# Patient Record
Sex: MALE | Race: WHITE | NOT HISPANIC OR LATINO | Employment: OTHER | URBAN - METROPOLITAN AREA
[De-identification: names, ages, dates, MRNs, and addresses within clinical notes are randomized per-mention and may not be internally consistent; named-entity substitution may affect disease eponyms.]

---

## 2020-04-01 ENCOUNTER — TELEPHONE (OUTPATIENT)
Dept: FAMILY MEDICINE CLINIC | Facility: CLINIC | Age: 85
End: 2020-04-01

## 2020-04-01 DIAGNOSIS — I10 HYPERTENSION, UNSPECIFIED TYPE: Primary | ICD-10-CM

## 2020-04-01 RX ORDER — TORSEMIDE 20 MG/1
20 TABLET ORAL DAILY
COMMUNITY
Start: 2019-12-28 | End: 2020-04-01 | Stop reason: SDUPTHER

## 2020-04-01 RX ORDER — TORSEMIDE 20 MG/1
20 TABLET ORAL DAILY
Qty: 90 TABLET | Refills: 1 | Status: SHIPPED | OUTPATIENT
Start: 2020-04-01 | End: 2020-04-09 | Stop reason: SDUPTHER

## 2020-04-09 DIAGNOSIS — I48.91 ATRIAL FIBRILLATION, UNSPECIFIED TYPE (HCC): Primary | ICD-10-CM

## 2020-04-09 DIAGNOSIS — I10 HYPERTENSION, UNSPECIFIED TYPE: ICD-10-CM

## 2020-04-09 RX ORDER — APIXABAN 2.5 MG/1
2.5 TABLET, FILM COATED ORAL 2 TIMES DAILY
COMMUNITY
Start: 2020-03-13 | End: 2020-04-09 | Stop reason: SDUPTHER

## 2020-04-09 RX ORDER — TORSEMIDE 20 MG/1
20 TABLET ORAL DAILY
Qty: 90 TABLET | Refills: 1 | Status: SHIPPED | OUTPATIENT
Start: 2020-04-09 | End: 2020-08-10 | Stop reason: SDUPTHER

## 2020-04-09 RX ORDER — APIXABAN 2.5 MG/1
2.5 TABLET, FILM COATED ORAL 2 TIMES DAILY
Qty: 180 TABLET | Refills: 1 | Status: SHIPPED | OUTPATIENT
Start: 2020-04-09 | End: 2020-08-10 | Stop reason: SDUPTHER

## 2020-08-10 DIAGNOSIS — I48.91 ATRIAL FIBRILLATION, UNSPECIFIED TYPE (HCC): ICD-10-CM

## 2020-08-10 DIAGNOSIS — I10 HYPERTENSION, UNSPECIFIED TYPE: ICD-10-CM

## 2020-08-10 RX ORDER — APIXABAN 2.5 MG/1
2.5 TABLET, FILM COATED ORAL 2 TIMES DAILY
Qty: 180 TABLET | Refills: 1 | Status: SHIPPED | OUTPATIENT
Start: 2020-08-10 | End: 2020-08-12 | Stop reason: SDUPTHER

## 2020-08-10 RX ORDER — TORSEMIDE 20 MG/1
20 TABLET ORAL DAILY
Qty: 90 TABLET | Refills: 1 | Status: SHIPPED | OUTPATIENT
Start: 2020-08-10 | End: 2020-08-12 | Stop reason: SDUPTHER

## 2020-08-10 NOTE — TELEPHONE ENCOUNTER
Patient needs refills on Eliquis 2 5 mg one twice daily and Demadex 20 mg one daily to [facility]

## 2020-08-12 DIAGNOSIS — I10 HYPERTENSION, UNSPECIFIED TYPE: ICD-10-CM

## 2020-08-12 DIAGNOSIS — I48.91 ATRIAL FIBRILLATION, UNSPECIFIED TYPE (HCC): ICD-10-CM

## 2020-08-12 RX ORDER — TORSEMIDE 20 MG/1
20 TABLET ORAL DAILY
Qty: 90 TABLET | Refills: 1 | Status: SHIPPED | OUTPATIENT
Start: 2020-08-12 | End: 2021-01-26

## 2020-08-12 RX ORDER — APIXABAN 2.5 MG/1
2.5 TABLET, FILM COATED ORAL 2 TIMES DAILY
Qty: 180 TABLET | Refills: 1 | Status: SHIPPED | OUTPATIENT
Start: 2020-08-12 | End: 2021-01-26

## 2020-08-20 ENCOUNTER — TELEMEDICINE (OUTPATIENT)
Dept: FAMILY MEDICINE CLINIC | Facility: CLINIC | Age: 85
End: 2020-08-20
Payer: MEDICARE

## 2020-08-20 DIAGNOSIS — N18.30 STAGE 3 CHRONIC KIDNEY DISEASE (HCC): ICD-10-CM

## 2020-08-20 DIAGNOSIS — I10 ESSENTIAL HYPERTENSION: Primary | ICD-10-CM

## 2020-08-20 DIAGNOSIS — I48.91 ATRIAL FIBRILLATION, UNSPECIFIED TYPE (HCC): ICD-10-CM

## 2020-08-20 DIAGNOSIS — Z95.0 PACEMAKER: ICD-10-CM

## 2020-08-20 DIAGNOSIS — C61 PROSTATE CANCER (HCC): ICD-10-CM

## 2020-08-20 DIAGNOSIS — K21.9 GASTROESOPHAGEAL REFLUX DISEASE WITHOUT ESOPHAGITIS: ICD-10-CM

## 2020-08-20 PROBLEM — G47.33 OSA (OBSTRUCTIVE SLEEP APNEA): Status: ACTIVE | Noted: 2019-02-11

## 2020-08-20 PROCEDURE — 99442 PR PHYS/QHP TELEPHONE EVALUATION 11-20 MIN: CPT | Performed by: FAMILY MEDICINE

## 2020-08-20 NOTE — PROGRESS NOTES
Virtual Brief Visit    Assessment/Plan:    Problem List Items Addressed This Visit        Digestive    Gastroesophageal reflux disease without esophagitis       Cardiovascular and Mediastinum    Essential hypertension - Primary    Atrial fibrillation ([facility] )       Genitourinary    Stage 3 chronic kidney disease ([facility] [address] )    Prostate cancer ([facility] [address] )       Other    Pacemaker                Reason for visit is No chief complaint on file  Encounter provider Santo Beaver MD    Provider located at [address] [facility] [address] [phone]    Recent Visits  No visits were found meeting these conditions  Showing recent visits within past 7 days and meeting all other requirements     Today's Visits  Date Type Provider Dept   08/20/20 Telemedicine Santo Beaver, [address] today's visits and meeting all other requirements     Future Appointments  No visits were found meeting these conditions  Showing future appointments within next 150 days and meeting all other requirements        After connecting through telephone, the patient was identified by name and date of birth  Chinyere Krishnan was informed that this is a telemedicine visit and that the visit is being conducted through telephone  My office door was closed  No one else was in the room  He acknowledged consent and understanding of privacy and security of the platform  The patient has agreed to participate and understands he can discontinue the visit at any time  Patient is aware this is a billable service  Subjective    Chinyere Krishnan is a 80 y o  male   Pt fro checkup on A fib, HTN, Prostate cancer and  Pacemaker checkup  Pt is doing  Well with his 2 meds and is comp(laining of fatigue  Pt  Is always fatigued but he has  High energy for  A man of his age and does not  Know trhe  Word stop  He is currently working on building another [employer]   This is not a new complaint for him  Pt had seen his cardiologist  Via virtual visit  Pt  Also has not been out of the house since 3/5/20  Past Medical History:   Diagnosis Date    Anemia     Atrial fibrillation (HCC)     GERD (gastroesophageal reflux disease)     Hypertension        Past Surgical History:   Procedure Laterality Date    CARDIAC ELECTROPHYSIOLOGY STUDY AND ABLATION      CARDIAC PACEMAKER PLACEMENT  05/2019    OTHER SURGICAL HISTORY      Cancer Surgery and Brachytherapy radioelements per Rajni    TONSILLECTOMY         Current Outpatient Medications   Medication Sig Dispense Refill    Eliquis 2 5 MG Take 1 tablet (2 5 mg total) by mouth 2 (two) times a day 180 tablet 1    torsemide (DEMADEX) 20 mg tablet Take 1 tablet (20 mg total) by mouth daily 90 tablet 1     No current facility-administered medications for this visit  Allergies not on file    Review of Systems   Constitutional: Positive for fatigue  Negative for activity change, appetite change, chills, fever and unexpected weight change  HENT: Negative for congestion, ear pain, hearing loss, mouth sores, postnasal drip, sinus pressure, sinus pain, sneezing and sore throat  Respiratory: Negative for apnea, cough, shortness of breath and wheezing  Cardiovascular: Negative for chest pain, palpitations and leg swelling  Gastrointestinal: Negative for abdominal pain, constipation, diarrhea, nausea and vomiting  Endocrine: Negative for cold intolerance and heat intolerance  Genitourinary: Negative for dysuria, frequency and hematuria  Musculoskeletal: Negative for arthralgias, back pain, gait problem, joint swelling and neck pain  Skin: Negative for rash  Neurological: Negative for dizziness, weakness and numbness  Hematological: Does not bruise/bleed easily  Psychiatric/Behavioral: Negative for agitation, behavioral problems, confusion, hallucinations and sleep disturbance  The patient is not nervous/anxious          There were no vitals filed for this visit  I spent 15 minutes directly with the patient during this visit    VIRTUAL VISIT DISCLAIMER    Reynold Hernandez acknowledges that he has consented to an online visit or consultation  He understands that the online visit is based solely on information provided by him, and that, in the absence of a face-to-face physical evaluation by the physician, the diagnosis he receives is both limited and provisional in terms of accuracy and completeness  This is not intended to replace a full medical face-to-face evaluation by the physician  Reynold Hernandez understands and accepts these terms

## 2021-01-24 DIAGNOSIS — I48.91 ATRIAL FIBRILLATION, UNSPECIFIED TYPE (HCC): ICD-10-CM

## 2021-01-24 DIAGNOSIS — I10 HYPERTENSION, UNSPECIFIED TYPE: ICD-10-CM

## 2021-01-26 RX ORDER — TORSEMIDE 20 MG/1
TABLET ORAL
Qty: 90 TABLET | Refills: 3 | Status: SHIPPED | OUTPATIENT
Start: 2021-01-26 | End: 2021-01-27

## 2021-01-26 RX ORDER — APIXABAN 2.5 MG/1
TABLET, FILM COATED ORAL
Qty: 180 TABLET | Refills: 3 | Status: SHIPPED | OUTPATIENT
Start: 2021-01-26 | End: 2021-01-27

## 2021-01-27 DIAGNOSIS — I48.91 ATRIAL FIBRILLATION, UNSPECIFIED TYPE (HCC): ICD-10-CM

## 2021-01-27 DIAGNOSIS — I10 HYPERTENSION, UNSPECIFIED TYPE: ICD-10-CM

## 2021-01-27 RX ORDER — APIXABAN 2.5 MG/1
TABLET, FILM COATED ORAL
Qty: 180 TABLET | Refills: 3 | Status: SHIPPED | OUTPATIENT
Start: 2021-01-27 | End: 2021-12-13

## 2021-01-27 RX ORDER — TORSEMIDE 20 MG/1
TABLET ORAL
Qty: 90 TABLET | Refills: 3 | Status: SHIPPED | OUTPATIENT
Start: 2021-01-27 | End: 2021-12-13

## 2021-02-16 ENCOUNTER — TELEPHONE (OUTPATIENT)
Dept: FAMILY MEDICINE CLINIC | Facility: CLINIC | Age: 86
End: 2021-02-16

## 2021-02-16 DIAGNOSIS — G47.33 OSA (OBSTRUCTIVE SLEEP APNEA): Primary | ICD-10-CM

## 2021-02-16 NOTE — TELEPHONE ENCOUNTER
Pt's wife Sadiq Bush) called and stated that patient is having problems with his CPAP and wants to know if you can order a home sleep study

## 2021-02-26 ENCOUNTER — TELEPHONE (OUTPATIENT)
Dept: SLEEP CENTER | Facility: CLINIC | Age: 86
End: 2021-02-26

## 2021-02-26 NOTE — TELEPHONE ENCOUNTER
----- Message from Johanna Sicard, MD sent at 2/25/2021 10:45 AM EST -----  Approved  ----- Message -----  From: Miriam Diaz  Sent: 5/02/0213   8:56 AM EST  To: Sleep Medicine Marcin Departed Provider    This sleep study needs approval      If approved please sign and return to clerical pool  If denied please include reasons why  Also provide alternative testing if warranted  Please sign and return to clerical pool

## 2021-03-03 ENCOUNTER — TELEMEDICINE (OUTPATIENT)
Dept: FAMILY MEDICINE CLINIC | Facility: CLINIC | Age: 86
End: 2021-03-03
Payer: MEDICARE

## 2021-03-03 DIAGNOSIS — J01.00 ACUTE NON-RECURRENT MAXILLARY SINUSITIS: Primary | ICD-10-CM

## 2021-03-03 PROCEDURE — 99442 PR PHYS/QHP TELEPHONE EVALUATION 11-20 MIN: CPT | Performed by: FAMILY MEDICINE

## 2021-03-03 RX ORDER — AMOXICILLIN 500 MG/1
500 TABLET, FILM COATED ORAL
Qty: 30 TABLET | Refills: 0 | Status: SHIPPED | OUTPATIENT
Start: 2021-03-03 | End: 2021-03-13

## 2021-03-03 NOTE — PROGRESS NOTES
Virtual Brief Visit    Assessment/Plan:    Problem List Items Addressed This Visit     None      Visit Diagnoses     Acute non-recurrent maxillary sinusitis    -  Primary    Relevant Medications    amoxicillin (AMOXIL) 500 MG tablet          BMI Counseling: BMI was not able to be calculated due to patient refusing height and/or weight  Reason for visit is No chief complaint on file  Encounter provider Gokul Cabral MD    Provider located at [address] [facility] [address] [phone]    Recent Visits  No visits were found meeting these conditions  Showing recent visits within past 7 days and meeting all other requirements     Today's Visits  Date Type Provider Dept   03/03/21 Telemedicine Gokul Cabral, [address] today's visits and meeting all other requirements     Future Appointments  No visits were found meeting these conditions  Showing future appointments within next 150 days and meeting all other requirements        After connecting through telephone, the patient was identified by name and date of birth  [employer] was informed that this is a telemedicine visit and that the visit is being conducted through telephone  My office door was closed  No one else was in the room  He acknowledged consent and understanding of privacy and security of the platform  The patient has agreed to participate and understands he can discontinue the visit at any time  Patient is aware this is a billable service  Subjective    Nicky Woods is a 80 y o  male   Pt for virtual visit  For  c/o sinus pain and pressure  And has post nasal drip  Pt  With no fevers nor ear pain  Pt  With not much cough but has some  Pt  Is asking to get a  Culture of his noise and his daughter will  for him         Past Medical History:   Diagnosis Date    Anemia     Atrial fibrillation (HCC)     GERD (gastroesophageal reflux disease)     Hypertension        Past Surgical History:   Procedure Laterality Date    CARDIAC ELECTROPHYSIOLOGY STUDY AND ABLATION      CARDIAC PACEMAKER PLACEMENT  05/2019    OTHER SURGICAL HISTORY      Cancer Surgery and Brachytherapy radioelements per Rajni    TONSILLECTOMY         Current Outpatient Medications   Medication Sig Dispense Refill    amoxicillin (AMOXIL) 500 MG tablet Take 1 tablet (500 mg total) by mouth 3 (three) times a day for 10 days 30 tablet 0    Eliquis 2 5 MG TAKE 1 TABLET BY MOUTH  TWICE DAILY 180 tablet 3    torsemide (DEMADEX) 20 mg tablet TAKE 1 TABLET BY MOUTH  DAILY 90 tablet 3     No current facility-administered medications for this visit  Not on File    Review of Systems   Constitutional: Positive for fatigue  HENT: Positive for postnasal drip, rhinorrhea, sinus pressure, sinus pain and sore throat  Respiratory: Positive for cough  There were no vitals filed for this visit  I spent 15 minutes directly with the patient during this visit    VIRTUAL VISIT DISCLAIMER    Severo Calk acknowledges that he has consented to an online visit or consultation  He understands that the online visit is based solely on information provided by him, and that, in the absence of a face-to-face physical evaluation by the physician, the diagnosis he receives is both limited and provisional in terms of accuracy and completeness  This is not intended to replace a full medical face-to-face evaluation by the physician  Severo Calk understands and accepts these terms

## 2021-05-12 ENCOUNTER — IMMUNIZATIONS (OUTPATIENT)
Dept: FAMILY MEDICINE CLINIC | Facility: HOSPITAL | Age: 86
End: 2021-05-12

## 2021-05-12 DIAGNOSIS — Z23 ENCOUNTER FOR IMMUNIZATION: Primary | ICD-10-CM

## 2021-05-12 PROCEDURE — 91300 SARS-COV-2 / COVID-19 MRNA VACCINE (PFIZER-BIONTECH) 30 MCG: CPT

## 2021-05-12 PROCEDURE — 0001A SARS-COV-2 / COVID-19 MRNA VACCINE (PFIZER-BIONTECH) 30 MCG: CPT

## 2021-06-02 ENCOUNTER — IMMUNIZATIONS (OUTPATIENT)
Dept: FAMILY MEDICINE CLINIC | Facility: HOSPITAL | Age: 86
End: 2021-06-02

## 2021-06-02 DIAGNOSIS — Z23 ENCOUNTER FOR IMMUNIZATION: Primary | ICD-10-CM

## 2021-06-02 PROCEDURE — 91300 SARS-COV-2 / COVID-19 MRNA VACCINE (PFIZER-BIONTECH) 30 MCG: CPT

## 2021-06-02 PROCEDURE — 0002A SARS-COV-2 / COVID-19 MRNA VACCINE (PFIZER-BIONTECH) 30 MCG: CPT

## 2021-06-16 ENCOUNTER — TELEPHONE (OUTPATIENT)
Dept: FAMILY MEDICINE CLINIC | Facility: CLINIC | Age: 86
End: 2021-06-16

## 2021-06-16 DIAGNOSIS — Z13.220 SCREENING CHOLESTEROL LEVEL: ICD-10-CM

## 2021-06-16 DIAGNOSIS — I10 HYPERTENSION, UNSPECIFIED TYPE: Primary | ICD-10-CM

## 2021-06-16 DIAGNOSIS — I48.91 ATRIAL FIBRILLATION, UNSPECIFIED TYPE (HCC): ICD-10-CM

## 2021-06-16 DIAGNOSIS — N18.30 STAGE 3 CHRONIC KIDNEY DISEASE, UNSPECIFIED WHETHER STAGE 3A OR 3B CKD (HCC): ICD-10-CM

## 2021-06-16 DIAGNOSIS — Z95.0 PACEMAKER: ICD-10-CM

## 2021-06-16 NOTE — TELEPHONE ENCOUNTER
Received a call stating that he saw the cardiologist yesterday and they are recommending for him to have bloodwork done  They are suggesting Thyroid, glucose, vit d, electrolights and UTI  She is requesting for you to order the bloodwork and anything else you think is necessary  And will come in for visit with done       Call when ready and they will go to a [facility]

## 2021-06-21 ENCOUNTER — APPOINTMENT (OUTPATIENT)
Dept: LAB | Facility: MEDICAL CENTER | Age: 86
End: 2021-06-21
Payer: MEDICARE

## 2021-06-21 DIAGNOSIS — I10 HYPERTENSION, UNSPECIFIED TYPE: ICD-10-CM

## 2021-06-21 DIAGNOSIS — Z13.220 SCREENING CHOLESTEROL LEVEL: ICD-10-CM

## 2021-06-21 LAB
ALBUMIN SERPL BCP-MCNC: 3.7 G/DL (ref 3.5–5)
ALP SERPL-CCNC: 76 U/L (ref 46–116)
ALT SERPL W P-5'-P-CCNC: 33 U/L (ref 12–78)
ANION GAP SERPL CALCULATED.3IONS-SCNC: 6 MMOL/L (ref 4–13)
AST SERPL W P-5'-P-CCNC: 20 U/L (ref 5–45)
BACTERIA UR QL AUTO: ABNORMAL /HPF
BASOPHILS # BLD AUTO: 0.05 THOUSANDS/ΜL (ref 0–0.1)
BASOPHILS NFR BLD AUTO: 1 % (ref 0–1)
BILIRUB SERPL-MCNC: 1.13 MG/DL (ref 0.2–1)
BILIRUB UR QL STRIP: NEGATIVE
BUN SERPL-MCNC: 44 MG/DL (ref 5–25)
CALCIUM SERPL-MCNC: 9.5 MG/DL (ref 8.3–10.1)
CHLORIDE SERPL-SCNC: 105 MMOL/L (ref 100–108)
CHOLEST SERPL-MCNC: 143 MG/DL (ref 50–200)
CLARITY UR: CLEAR
CO2 SERPL-SCNC: 28 MMOL/L (ref 21–32)
COLOR UR: ABNORMAL
CREAT SERPL-MCNC: 1.84 MG/DL (ref 0.6–1.3)
EOSINOPHIL # BLD AUTO: 0.17 THOUSAND/ΜL (ref 0–0.61)
EOSINOPHIL NFR BLD AUTO: 4 % (ref 0–6)
ERYTHROCYTE [DISTWIDTH] IN BLOOD BY AUTOMATED COUNT: 15.8 % (ref 11.6–15.1)
GFR SERPL CREATININE-BSD FRML MDRD: 32 ML/MIN/1.73SQ M
GLUCOSE P FAST SERPL-MCNC: 91 MG/DL (ref 65–99)
GLUCOSE UR STRIP-MCNC: NEGATIVE MG/DL
HCT VFR BLD AUTO: 46.2 % (ref 36.5–49.3)
HDLC SERPL-MCNC: 40 MG/DL
HGB BLD-MCNC: 14.8 G/DL (ref 12–17)
HGB UR QL STRIP.AUTO: NEGATIVE
HYALINE CASTS #/AREA URNS LPF: ABNORMAL /LPF
IMM GRANULOCYTES # BLD AUTO: 0.01 THOUSAND/UL (ref 0–0.2)
IMM GRANULOCYTES NFR BLD AUTO: 0 % (ref 0–2)
KETONES UR STRIP-MCNC: NEGATIVE MG/DL
LDLC SERPL CALC-MCNC: 89 MG/DL (ref 0–100)
LEUKOCYTE ESTERASE UR QL STRIP: NEGATIVE
LYMPHOCYTES # BLD AUTO: 0.63 THOUSANDS/ΜL (ref 0.6–4.47)
LYMPHOCYTES NFR BLD AUTO: 15 % (ref 14–44)
MAGNESIUM SERPL-MCNC: 2.8 MG/DL (ref 1.6–2.6)
MCH RBC QN AUTO: 32 PG (ref 26.8–34.3)
MCHC RBC AUTO-ENTMCNC: 32 G/DL (ref 31.4–37.4)
MCV RBC AUTO: 100 FL (ref 82–98)
MONOCYTES # BLD AUTO: 0.47 THOUSAND/ΜL (ref 0.17–1.22)
MONOCYTES NFR BLD AUTO: 11 % (ref 4–12)
NEUTROPHILS # BLD AUTO: 2.85 THOUSANDS/ΜL (ref 1.85–7.62)
NEUTS SEG NFR BLD AUTO: 69 % (ref 43–75)
NITRITE UR QL STRIP: NEGATIVE
NON-SQ EPI CELLS URNS QL MICRO: ABNORMAL /HPF
NRBC BLD AUTO-RTO: 0 /100 WBCS
PH UR STRIP.AUTO: 6.5 [PH]
PLATELET # BLD AUTO: 152 THOUSANDS/UL (ref 149–390)
PMV BLD AUTO: 10.7 FL (ref 8.9–12.7)
POTASSIUM SERPL-SCNC: 4.5 MMOL/L (ref 3.5–5.3)
PROT SERPL-MCNC: 7 G/DL (ref 6.4–8.2)
PROT UR STRIP-MCNC: ABNORMAL MG/DL
RBC # BLD AUTO: 4.62 MILLION/UL (ref 3.88–5.62)
RBC #/AREA URNS AUTO: ABNORMAL /HPF
SODIUM SERPL-SCNC: 139 MMOL/L (ref 136–145)
SP GR UR STRIP.AUTO: 1.02 (ref 1–1.03)
T4 FREE SERPL-MCNC: 0.97 NG/DL (ref 0.76–1.46)
TRIGL SERPL-MCNC: 70 MG/DL
TSH SERPL DL<=0.05 MIU/L-ACNC: 5.25 UIU/ML (ref 0.36–3.74)
URATE SERPL-MCNC: 6.7 MG/DL (ref 4.2–8)
UROBILINOGEN UR QL STRIP.AUTO: 0.2 E.U./DL
WBC # BLD AUTO: 4.18 THOUSAND/UL (ref 4.31–10.16)
WBC #/AREA URNS AUTO: ABNORMAL /HPF

## 2021-06-21 PROCEDURE — 84550 ASSAY OF BLOOD/URIC ACID: CPT

## 2021-06-21 PROCEDURE — 81001 URINALYSIS AUTO W/SCOPE: CPT | Performed by: FAMILY MEDICINE

## 2021-06-21 PROCEDURE — 83735 ASSAY OF MAGNESIUM: CPT

## 2021-06-21 PROCEDURE — 80053 COMPREHEN METABOLIC PANEL: CPT

## 2021-06-21 PROCEDURE — 84439 ASSAY OF FREE THYROXINE: CPT

## 2021-06-21 PROCEDURE — 80061 LIPID PANEL: CPT

## 2021-06-21 PROCEDURE — 36415 COLL VENOUS BLD VENIPUNCTURE: CPT

## 2021-06-21 PROCEDURE — 84443 ASSAY THYROID STIM HORMONE: CPT

## 2021-06-21 PROCEDURE — 85025 COMPLETE CBC W/AUTO DIFF WBC: CPT

## 2021-07-06 PROBLEM — I50.32 CHRONIC DIASTOLIC CONGESTIVE HEART FAILURE (HCC): Status: ACTIVE | Noted: 2021-07-06

## 2021-07-08 ENCOUNTER — OFFICE VISIT (OUTPATIENT)
Dept: FAMILY MEDICINE CLINIC | Facility: CLINIC | Age: 86
End: 2021-07-08
Payer: MEDICARE

## 2021-07-08 VITALS
HEART RATE: 83 BPM | TEMPERATURE: 97.4 F | SYSTOLIC BLOOD PRESSURE: 118 MMHG | DIASTOLIC BLOOD PRESSURE: 64 MMHG | WEIGHT: 155 LBS | HEIGHT: 70 IN | BODY MASS INDEX: 22.19 KG/M2 | OXYGEN SATURATION: 97 % | RESPIRATION RATE: 16 BRPM

## 2021-07-08 DIAGNOSIS — N18.30 STAGE 3 CHRONIC KIDNEY DISEASE, UNSPECIFIED WHETHER STAGE 3A OR 3B CKD (HCC): ICD-10-CM

## 2021-07-08 DIAGNOSIS — Z23 ENCOUNTER FOR IMMUNIZATION: ICD-10-CM

## 2021-07-08 DIAGNOSIS — K21.9 GASTROESOPHAGEAL REFLUX DISEASE WITHOUT ESOPHAGITIS: Primary | ICD-10-CM

## 2021-07-08 DIAGNOSIS — C61 PROSTATE CANCER (HCC): ICD-10-CM

## 2021-07-08 DIAGNOSIS — F02.80 EARLY ONSET ALZHEIMER'S DEMENTIA WITHOUT BEHAVIORAL DISTURBANCE (HCC): ICD-10-CM

## 2021-07-08 DIAGNOSIS — I48.91 ATRIAL FIBRILLATION, UNSPECIFIED TYPE (HCC): ICD-10-CM

## 2021-07-08 DIAGNOSIS — I10 ESSENTIAL HYPERTENSION: ICD-10-CM

## 2021-07-08 DIAGNOSIS — Z00.00 WELL ADULT EXAM: ICD-10-CM

## 2021-07-08 DIAGNOSIS — G30.0 EARLY ONSET ALZHEIMER'S DEMENTIA WITHOUT BEHAVIORAL DISTURBANCE (HCC): ICD-10-CM

## 2021-07-08 DIAGNOSIS — I50.32 CHRONIC DIASTOLIC CONGESTIVE HEART FAILURE (HCC): ICD-10-CM

## 2021-07-08 DIAGNOSIS — J41.0 SIMPLE CHRONIC BRONCHITIS (HCC): ICD-10-CM

## 2021-07-08 DIAGNOSIS — Z95.0 PACEMAKER: ICD-10-CM

## 2021-07-08 PROCEDURE — 99214 OFFICE O/P EST MOD 30 MIN: CPT | Performed by: FAMILY MEDICINE

## 2021-07-08 PROCEDURE — 1123F ACP DISCUSS/DSCN MKR DOCD: CPT | Performed by: FAMILY MEDICINE

## 2021-07-08 PROCEDURE — G0439 PPPS, SUBSEQ VISIT: HCPCS | Performed by: FAMILY MEDICINE

## 2021-07-08 RX ORDER — ACETAMINOPHEN 500 MG
500 TABLET ORAL 2 TIMES DAILY
COMMUNITY

## 2021-07-08 NOTE — PATIENT INSTRUCTIONS
Medicare Preventive Visit Patient Instructions  Thank you for completing your Welcome to Medicare Visit or Medicare Annual Wellness Visit today  Your next wellness visit will be due in one year (7/9/2022)  The screening/preventive services that you may require over the next 5-10 years are detailed below  Some tests may not apply to you based off risk factors and/or age  Screening tests ordered at today's visit but not completed yet may show as past due  Also, please note that scanned in results may not display below  Preventive Screenings:  Service Recommendations Previous Testing/Comments   Colorectal Cancer Screening  · Colonoscopy    · Fecal Occult Blood Test (FOBT)/Fecal Immunochemical Test (FIT)  · Fecal DNA/Cologuard Test  · Flexible Sigmoidoscopy Age: 54-65 years old   Colonoscopy: every 10 years (May be performed more frequently if at higher risk)  OR  FOBT/FIT: every 1 year  OR  Cologuard: every 3 years  OR  Sigmoidoscopy: every 5 years  Screening may be recommended earlier than age 48 if at higher risk for colorectal cancer  Also, an individualized decision between you and your healthcare provider will decide whether screening between the ages of 74-80 would be appropriate   Colonoscopy: Not on file  FOBT/FIT: Not on file  Cologuard: Not on file  Sigmoidoscopy: Not on file    Screening Not Indicated     Prostate Cancer Screening Individualized decision between patient and health care provider in men between ages of 53-78   Medicare will cover every 12 months beginning on the day after your 50th birthday PSA: No results in last 5 years     History Prostate Cancer  Screening Not Indicated     Hepatitis C Screening Once for adults born between [birth year] and 1965  More frequently in patients at high risk for Hepatitis C Hep C Antibody: Not on file        Diabetes Screening 1-2 times per year if you're at risk for diabetes or have pre-diabetes Fasting glucose: 91 mg/dL   A1C: No results in last 5 years    Screening Current   Cholesterol Screening Once every 5 years if you don't have a lipid disorder  May order more often based on risk factors  Lipid panel: 06/21/2021    Screening Current      Other Preventive Screenings Covered by Medicare:  1  Abdominal Aortic Aneurysm (AAA) Screening: covered once if your at risk  You're considered to be at risk if you have a family history of AAA or a male between the age of 73-68 who smoking at least 100 cigarettes in your lifetime  2  Lung Cancer Screening: covers low dose CT scan once per year if you meet all of the following conditions: (1) Age 50-69; (2) No signs or symptoms of lung cancer; (3) Current smoker or have quit smoking within the last 15 years; (4) You have a tobacco smoking history of at least 30 pack years (packs per day x number of years you smoked); (5) You get a written order from a healthcare provider  3  Glaucoma Screening: covered annually if you're considered high risk: (1) You have diabetes OR (2) Family history of glaucoma OR (3)  aged 48 and older OR (3)  American aged 72 and older  3  Osteoporosis Screening: covered every 2 years if you meet one of the following conditions: (1) Have a vertebral abnormality; (2) On glucocorticoid therapy for more than 3 months; (3) Have primary hyperparathyroidism; (4) On osteoporosis medications and need to assess response to drug therapy  5  HIV Screening: covered annually if you're between the age of 12-76  Also covered annually if you are younger than 13 and older than 72 with risk factors for HIV infection  For pregnant patients, it is covered up to 3 times per pregnancy      Immunizations:  Immunization Recommendations   Influenza Vaccine Annual influenza vaccination during flu season is recommended for all persons aged >= 6 months who do not have contraindications   Pneumococcal Vaccine (Prevnar and Pneumovax)  * Prevnar = PCV13  * Pneumovax = PPSV23 Adults 25-60 years old: 1-3 doses may be recommended based on certain risk factors  Adults 72 years old: Prevnar (PCV13) vaccine recommended followed by Pneumovax (PPSV23) vaccine  If already received PPSV23 since turning 65, then PCV13 recommended at least one year after PPSV23 dose  Hepatitis B Vaccine 3 dose series if at intermediate or high risk (ex: diabetes, end stage renal disease, liver disease)   Tetanus (Td) Vaccine - COST NOT COVERED BY MEDICARE PART B Following completion of primary series, a booster dose should be given every 10 years to maintain immunity against tetanus  Td may also be given as tetanus wound prophylaxis  Tdap Vaccine - COST NOT COVERED BY MEDICARE PART B Recommended at least once for all adults  For pregnant patients, recommended with each pregnancy  Shingles Vaccine (Shingrix) - COST NOT COVERED BY MEDICARE PART B  2 shot series recommended in those aged 48 and above     Health Maintenance Due:  There are no preventive care reminders to display for this patient  Immunizations Due:      Topic Date Due    Pneumococcal Vaccine: 65+ Years (1 of 2 - PPSV23) Never done    DTaP,Tdap,and Td Vaccines (1 - Tdap) Never done    Influenza Vaccine (1) 09/01/2021     Advance Directives   What are advance directives? Advance directives are legal documents that state your wishes and plans for medical care  These plans are made ahead of time in case you lose your ability to make decisions for yourself  Advance directives can apply to any medical decision, such as the treatments you want, and if you want to donate organs  What are the types of advance directives? There are many types of advance directives, and each state has rules about how to use them  You may choose a combination of any of the following:  · Living will: This is a written record of the treatment you want  You can also choose which treatments you do not want, which to limit, and which to stop at a certain time   This includes surgery, medicine, IV fluid, and tube feedings  · Durable power of  for healthcare [facility]): This is a written record that states who you want to make healthcare choices for you when you are unable to make them for yourself  This person, called a proxy, is usually a family member or a friend  You may choose more than 1 proxy  · Do not resuscitate (DNR) order:  A DNR order is used in case your heart stops beating or you stop breathing  It is a request not to have certain forms of treatment, such as CPR  A DNR order may be included in other types of advance directives  · Medical directive: This covers the care that you want if you are in a coma, near death, or unable to make decisions for yourself  You can list the treatments you want for each condition  Treatment may include pain medicine, surgery, blood transfusions, dialysis, IV or tube feedings, and a ventilator (breathing machine)  · Values history: This document has questions about your views, beliefs, and how you feel and think about life  This information can help others choose the care that you would choose  Why are advance directives important? An advance directive helps you control your care  Although spoken wishes may be used, it is better to have your wishes written down  Spoken wishes can be misunderstood, or not followed  Treatments may be given even if you do not want them  An advance directive may make it easier for your family to make difficult choices about your care  © Copyright [employer] 2018 Information is for End User's use only and may not be sold, redistributed or otherwise used for commercial purposes   All illustrations and images included in CareNotes® are the copyrighted property of A D A M , Inc  or [address] [place], [place] [address]

## 2021-07-08 NOTE — PROGRESS NOTES
Assessment and Plan:     Problem List Items Addressed This Visit        Digestive    Gastroesophageal reflux disease without esophagitis - Primary     Patient to continue with present therapy and decrease caffeine, avoid ETOH and smoking to decrease acid production  Pt should also cease eating prior to bedtime and avoid excessive fluid intake prior to sleep  May use antacids as needed for breakthrough GERD  All pateint questions answered today about this condition  Cardiovascular and Mediastinum    Essential hypertension     Patient is stable with current anti-hypertensive medicine and continue to follow a low sodium diet and take current medication  All questions about this condition were answered today  Atrial fibrillation [facility])     Patient is stable  and will continue present plan of care and reassess at next routine visit  All questions about this problem from patient were answered today  Genitourinary    Stage 3 chronic kidney disease ([facility] [address] )     Lab Results   Component Value Date    EGFR 32 06/21/2021    CREATININE 1 84 (H) 06/21/2021    CREATININE 1 48 (H) 10/20/2014   Patient is stable  and will continue present plan of care and reassess at next routine visit  All questions about this problem from patient were answered today  Other    Pacemaker     Patient is stable  and will continue present plan of care and reassess at next routine visit  All questions about this problem from patient were answered today  Other Visit Diagnoses     Encounter for immunization        Well adult exam               Preventive health issues were discussed with patient, and age appropriate screening tests were ordered as noted in patient's After Visit Summary  Personalized health advice and appropriate referrals for health education or preventive services given if needed, as noted in patient's After Visit Summary       History of Present Illness:     Patient presents for Medicare Annual Wellness visit    Patient Care Team:  Yoselin Henderson MD as PCP - General (Family Medicine)     Problem List:     Patient Active Problem List   Diagnosis    Stage 3 chronic kidney disease ([facility] )    CARROLL (obstructive sleep apnea)    Pacemaker    Prostate cancer ([facility] )    Essential hypertension    Gastroesophageal reflux disease without esophagitis    Atrial fibrillation (HCC)    Chronic diastolic congestive heart failure ([facility] )      Past Medical and Surgical History:     Past Medical History:   Diagnosis Date    Anemia     Atrial fibrillation ([facility] )     Hypertension      Past Surgical History:   Procedure Laterality Date    CARDIAC ELECTROPHYSIOLOGY STUDY AND ABLATION      CARDIAC PACEMAKER PLACEMENT  2019    OTHER SURGICAL HISTORY      Cancer Surgery and Brachytherapy radioelements per [facility]    TONSILLECTOMY        Family History:     Family History   Family history unknown: Yes      Social History:     Social History     Socioeconomic History    Marital status: /Civil Union     Spouse name: None    Number of children: None    Years of education: None    Highest education level: None   Occupational History    Occupation: Retired   Tobacco Use    Smoking status: Never Smoker    Smokeless tobacco: Never Used   Vaping Use    Vaping Use: Never used   Substance and Sexual Activity    Alcohol use: Yes     Comment: occasional    Drug use: None    Sexual activity: None   Other Topics Concern    None   Social History Narrative    · Most recent tobacco use screenin2020      · Do you currently or have you served in the [employer]:   No      · Were you activated, into active duty, as a member of the [employer] or as a Reservist:   No      · Exercise level: Moderate      · Diet:   Regular      · General stress level:   Medium      · Alcohol intake:   Occasional      · Caffeine intake:   Heavy      · Seat belts used routinely:   Yes      · Smoke alarm in home:    Yes · Advance directive:   Yes      Social Determinants of Health     Financial Resource Strain:     Difficulty of Paying Living Expenses:    Food Insecurity:     Worried About Running Out of Food in the Last Year:     [address] in the Last Year:    Transportation Needs:     Lack of Transportation (Medical):  Lack of Transportation (Non-Medical):    Physical Activity:     Days of Exercise per Week:     Minutes of Exercise per Session:    Stress:     Feeling of Stress :    Social Connections:     Frequency of Communication with Friends and Family:     Frequency of Social Gatherings with Friends and Family:     Attends Spiritism Services:     Active Member of Clubs or Organizations:     Attends Club or Organization Meetings:     Marital Status:    Intimate Partner Violence:     Fear of Current or Ex-Partner:     Emotionally Abused:     Physically Abused:     Sexually Abused:       Medications and Allergies:     Current Outpatient Medications   Medication Sig Dispense Refill    acetaminophen (TYLENOL) 500 mg tablet Take 500 mg by mouth 2 (two) times a day      bimatoprost (LUMIGAN) 0 01 % ophthalmic drops 1 drop daily at bedtime      Eliquis 2 5 MG TAKE 1 TABLET BY MOUTH  TWICE DAILY 180 tablet 3    torsemide (DEMADEX) 20 mg tablet TAKE 1 TABLET BY MOUTH  DAILY 90 tablet 3    ascorbic acid (VITAMIN C) 1000 MG tablet Take 1,000 mg by mouth daily       No current facility-administered medications for this visit  No Known Allergies   Immunizations:     Immunization History   Administered Date(s) Administered    SARS-CoV-2 / COVID-19 mRNA IM (Pfizer-BioNTech) 05/12/2021, 06/02/2021      Health Maintenance: There are no preventive care reminders to display for this patient        Topic Date Due    Pneumococcal Vaccine: 65+ Years (1 of 2 - PPSV23) Never done    DTaP,Tdap,and Td Vaccines (1 - Tdap) Never done    Influenza Vaccine (1) 09/01/2021      Medicare Health Risk Assessment: /64 (BP Location: Left arm, Patient Position: Sitting, Cuff Size: Standard)   Pulse 83   Temp (!) 97 4 °F (36 3 °C)   Resp 16   Ht 5' 10" (1 778 m)   Wt 70 3 kg (155 lb)   SpO2 97%   BMI 22 24 kg/m²      Brenda Gray is here for his Initial Wellness visit  Health Risk Assessment:   Patient rates overall health as good  Patient feels that their physical health rating is same  Patient is satisfied with their life  Eyesight was rated as slightly worse  Hearing was rated as slightly worse  Patient feels that their emotional and mental health rating is same  Patients states they are never, rarely angry  Patient states they are often unusually tired/fatigued  Pain experienced in the last 7 days has been none  Patient states that he has experienced no weight loss or gain in last 6 months  Depression Screening:   PHQ-2 Score: 0      Fall Risk Screening: In the past year, patient has experienced: no history of falling in past year      Home Safety:  Patient has trouble with stairs inside or outside of their home  Patient has working smoke alarms and has working carbon monoxide detector  Home safety hazards include: none  Nutrition:   Current diet is Regular  Medications:   Patient is not currently taking any over-the-counter supplements  Patient is able to manage medications  Activities of Daily Living (ADLs)/Instrumental Activities of Daily Living (IADLs):   Walk and transfer into and out of bed and chair?: Yes  Dress and groom yourself?: Yes    Bathe or shower yourself?: Yes    Feed yourself?  Yes  Do your laundry/housekeeping?: No  Manage your money, pay your bills and track your expenses?: No  Make your own meals?: Yes    Do your own shopping?: No    Previous Hospitalizations:   Any hospitalizations or ED visits within the last 12 months?: No      Advance Care Planning:   Living will: Yes    Advanced directive: Yes      Cognitive Screening:   Provider or family/friend/caregiver concerned regarding cognition?: Yes    PREVENTIVE SCREENINGS      Cardiovascular Screening:    General: Screening Current      Diabetes Screening:     General: Screening Current      Colorectal Cancer Screening:     General: Screening Not Indicated      Prostate Cancer Screening:    General: History Prostate Cancer and Screening Not Indicated      Lung Cancer Screening:     General: Screening Not Indicated    Screening, Brief Intervention, and Referral to Treatment (SBIRT)    Screening  Typical number of drinks in a day: 0  Typical number of drinks in a week: 0  Interpretation: Low risk drinking behavior      Single Item Drug Screening:  How often have you used an illegal drug (including marijuana) or a prescription medication for non-medical reasons in the past year? never    Single Item Drug Screen Score: 0  Interpretation: Negative screen for possible drug use disorder      Jaylan Guerrier MD

## 2021-07-08 NOTE — PROGRESS NOTES
Falls Plan of Care: balance, strength, and gait training instructions were provided  Home safety education provided  Assessment/Plan:         Problem List Items Addressed This Visit        Digestive    Gastroesophageal reflux disease without esophagitis - Primary     Patient to continue with present therapy and decrease caffeine, avoid ETOH and smoking to decrease acid production  Pt should also cease eating prior to bedtime and avoid excessive fluid intake prior to sleep  May use antacids as needed for breakthrough GERD  All pateint questions answered today about this condition  Cardiovascular and Mediastinum    Essential hypertension     Patient is stable with current anti-hypertensive medicine and continue to follow a low sodium diet and take current medication  All questions about this condition were answered today  Atrial fibrillation [facility])     Patient is stable  and will continue present plan of care and reassess at next routine visit  All questions about this problem from patient were answered today  Genitourinary    Stage 3 chronic kidney disease ([facility] [address] )     Lab Results   Component Value Date    EGFR 32 06/21/2021    CREATININE 1 84 (H) 06/21/2021    CREATININE 1 48 (H) 10/20/2014   Patient is stable  and will continue present plan of care and reassess at next routine visit  All questions about this problem from patient were answered today  Other    Pacemaker     Patient is stable  and will continue present plan of care and reassess at next routine visit  All questions about this problem from patient were answered today  Other Visit Diagnoses     Encounter for immunization                Subjective:      Patient ID: Boo Tejeda is a 80 y o  male  THIS IS AN 80YEAR-OLD WHITE MALE HERE TODAY FOR CHECKUP ON GERD HYPERTENSION HISTORY OF ATRIAL FIBRILLATION AND ALSO HISTORY OF ANEMIA    PATIENT SEEN BACK IN THE OFFICE AFTER YEAR AND HALF WITH THE COVID SHOT DOWN HAS SOME DECLINE IN HIS MENTAL STATUS DOES HAVE SOME EARLY SIGNS OF DECLINE IN MEMORY AND JUDGMENT PERSONALITY AVILA IS GOOD GETTING SOME DAYS AND NIGHTS MIXED UP  PATIENT WILL NEED TO GET EVALUATED FOR SOME ALZHEIMER'S SEE ABOUT DOING A FOLSTEIN A B12 AND FOLATE TO SEE IF AND WHERE THIS GENTLEMAN IS AT ON THE SCALE AT IF AT ALL  The following portions of the patient's history were reviewed and updated as appropriate:   Past Medical History:  He has a past medical history of Anemia, Atrial fibrillation ([address] ), and Hypertension  ,  _______________________________________________________________________  Medical Problems:  does not have any pertinent problems on file ,  _______________________________________________________________________  Past Surgical History:   has a past surgical history that includes Cardiac electrophysiology study and ablation; Tonsillectomy; Cardiac pacemaker placement (05/2019); and Other surgical history  ,  _______________________________________________________________________  Family History:  Family history is unknown by patient  ,  _______________________________________________________________________  Social History:   reports that he has never smoked  He has never used smokeless tobacco  He reports current alcohol use  No history on file for drug use ,  _______________________________________________________________________  Allergies:  has No Known Allergies     _______________________________________________________________________  Current Outpatient Medications   Medication Sig Dispense Refill    Eliquis 2 5 MG TAKE 1 TABLET BY MOUTH  TWICE DAILY 180 tablet 3    torsemide (DEMADEX) 20 mg tablet TAKE 1 TABLET BY MOUTH  DAILY 90 tablet 3     No current facility-administered medications for this visit      _______________________________________________________________________  Review of Systems   Constitutional: Negative for activity change, appetite change, chills, fatigue, fever and unexpected weight change  HENT: Negative for congestion, ear pain, hearing loss, mouth sores, postnasal drip, sinus pressure, sinus pain, sneezing and sore throat  Respiratory: Negative for apnea, cough, shortness of breath and wheezing  Cardiovascular: Negative for chest pain, palpitations and leg swelling  Gastrointestinal: Negative for abdominal pain, constipation, diarrhea, nausea and vomiting  Endocrine: Negative for cold intolerance and heat intolerance  Genitourinary: Negative for dysuria, frequency and hematuria  Musculoskeletal: Negative for arthralgias, back pain, gait problem, joint swelling and neck pain  Skin: Negative for rash  Neurological: Negative for dizziness, weakness and numbness  Hematological: Does not bruise/bleed easily  Psychiatric/Behavioral: Negative for agitation, behavioral problems, confusion, hallucinations and sleep disturbance  The patient is not nervous/anxious  Decreased mental capacity needs Folstein  Objective: There were no vitals filed for this visit  There is no height or weight on file to calculate BMI  Physical Exam  Vitals and nursing note reviewed  Constitutional:       Appearance: He is well-developed  HENT:      Head: Normocephalic and atraumatic  Nose: Nose normal    Eyes:      General: No scleral icterus  Conjunctiva/sclera: Conjunctivae normal       Pupils: Pupils are equal, round, and reactive to light  Neck:      Thyroid: No thyromegaly  Cardiovascular:      Rate and Rhythm: Normal rate and regular rhythm  Heart sounds: Normal heart sounds  Pulmonary:      Effort: Pulmonary effort is normal  No respiratory distress  Breath sounds: Normal breath sounds  No wheezing  Abdominal:      General: Bowel sounds are normal       Palpations: Abdomen is soft  Tenderness: There is no abdominal tenderness  There is no guarding or rebound     Musculoskeletal: General: Normal range of motion  Cervical back: Normal range of motion and neck supple  Skin:     General: Skin is warm and dry  Findings: No rash  Neurological:      Mental Status: He is alert     Psychiatric:         Behavior: Behavior normal

## 2021-07-08 NOTE — ASSESSMENT & PLAN NOTE
Lab Results   Component Value Date    EGFR 32 06/21/2021    CREATININE 1 84 (H) 06/21/2021    CREATININE 1 48 (H) 10/20/2014   Patient is stable  and will continue present plan of care and reassess at next routine visit  All questions about this problem from patient were answered today

## 2021-07-20 ENCOUNTER — APPOINTMENT (OUTPATIENT)
Dept: LAB | Facility: MEDICAL CENTER | Age: 86
End: 2021-07-20
Payer: MEDICARE

## 2021-07-20 DIAGNOSIS — F02.80 EARLY ONSET ALZHEIMER'S DEMENTIA WITHOUT BEHAVIORAL DISTURBANCE (HCC): ICD-10-CM

## 2021-07-20 DIAGNOSIS — G30.0 EARLY ONSET ALZHEIMER'S DEMENTIA WITHOUT BEHAVIORAL DISTURBANCE (HCC): ICD-10-CM

## 2021-07-20 LAB
FOLATE SERPL-MCNC: >20 NG/ML (ref 3.1–17.5)
VIT B12 SERPL-MCNC: 4315 PG/ML (ref 100–900)

## 2021-07-20 PROCEDURE — 36415 COLL VENOUS BLD VENIPUNCTURE: CPT

## 2021-07-20 PROCEDURE — 82746 ASSAY OF FOLIC ACID SERUM: CPT

## 2021-07-20 PROCEDURE — 82607 VITAMIN B-12: CPT

## 2021-07-26 ENCOUNTER — OFFICE VISIT (OUTPATIENT)
Dept: FAMILY MEDICINE CLINIC | Facility: CLINIC | Age: 86
End: 2021-07-26
Payer: MEDICARE

## 2021-07-26 ENCOUNTER — TELEPHONE (OUTPATIENT)
Dept: FAMILY MEDICINE CLINIC | Facility: CLINIC | Age: 86
End: 2021-07-26

## 2021-07-26 VITALS
SYSTOLIC BLOOD PRESSURE: 116 MMHG | BODY MASS INDEX: 22.33 KG/M2 | OXYGEN SATURATION: 98 % | RESPIRATION RATE: 16 BRPM | WEIGHT: 156 LBS | HEART RATE: 72 BPM | DIASTOLIC BLOOD PRESSURE: 70 MMHG | TEMPERATURE: 97.7 F | HEIGHT: 70 IN

## 2021-07-26 DIAGNOSIS — G30.0 EARLY ONSET ALZHEIMER'S DEMENTIA WITH BEHAVIORAL DISTURBANCE (HCC): ICD-10-CM

## 2021-07-26 DIAGNOSIS — I48.91 ATRIAL FIBRILLATION, UNSPECIFIED TYPE (HCC): Primary | ICD-10-CM

## 2021-07-26 DIAGNOSIS — F02.81 EARLY ONSET ALZHEIMER'S DEMENTIA WITH BEHAVIORAL DISTURBANCE (HCC): ICD-10-CM

## 2021-07-26 DIAGNOSIS — N18.30 STAGE 3 CHRONIC KIDNEY DISEASE, UNSPECIFIED WHETHER STAGE 3A OR 3B CKD (HCC): ICD-10-CM

## 2021-07-26 DIAGNOSIS — I10 ESSENTIAL HYPERTENSION: ICD-10-CM

## 2021-07-26 PROCEDURE — 99213 OFFICE O/P EST LOW 20 MIN: CPT | Performed by: FAMILY MEDICINE

## 2021-07-26 RX ORDER — DONEPEZIL HYDROCHLORIDE 10 MG/1
10 TABLET, FILM COATED ORAL
Qty: 90 TABLET | Refills: 1 | Status: SHIPPED | OUTPATIENT
Start: 2021-07-26 | End: 2021-10-14

## 2021-07-26 NOTE — TELEPHONE ENCOUNTER
Kylah Knight was in earlier with her father and forgot to ask about the results for the recent bloodwork that was done  Please review and advise and I can call her back if needed  She can be reached at [phone]  If not available can leave message

## 2021-08-04 ENCOUNTER — APPOINTMENT (OUTPATIENT)
Dept: RADIOLOGY | Facility: MEDICAL CENTER | Age: 86
End: 2021-08-04
Payer: MEDICARE

## 2021-08-04 ENCOUNTER — OFFICE VISIT (OUTPATIENT)
Dept: URGENT CARE | Facility: MEDICAL CENTER | Age: 86
End: 2021-08-04
Payer: MEDICARE

## 2021-08-04 VITALS
WEIGHT: 155 LBS | BODY MASS INDEX: 22.19 KG/M2 | OXYGEN SATURATION: 97 % | HEART RATE: 79 BPM | TEMPERATURE: 97.3 F | DIASTOLIC BLOOD PRESSURE: 66 MMHG | HEIGHT: 70 IN | SYSTOLIC BLOOD PRESSURE: 146 MMHG | RESPIRATION RATE: 22 BRPM

## 2021-08-04 DIAGNOSIS — S51.012A SKIN TEAR OF LEFT ELBOW WITHOUT COMPLICATION, INITIAL ENCOUNTER: ICD-10-CM

## 2021-08-04 DIAGNOSIS — S29.9XXA RIB INJURY: ICD-10-CM

## 2021-08-04 DIAGNOSIS — S29.9XXA RIB INJURY: Primary | ICD-10-CM

## 2021-08-04 PROCEDURE — 71101 X-RAY EXAM UNILAT RIBS/CHEST: CPT

## 2021-08-04 PROCEDURE — G0463 HOSPITAL OUTPT CLINIC VISIT: HCPCS | Performed by: PHYSICIAN ASSISTANT

## 2021-08-04 PROCEDURE — 90715 TDAP VACCINE 7 YRS/> IM: CPT

## 2021-08-04 PROCEDURE — 99213 OFFICE O/P EST LOW 20 MIN: CPT | Performed by: PHYSICIAN ASSISTANT

## 2021-08-04 PROCEDURE — 90471 IMMUNIZATION ADMIN: CPT | Performed by: PHYSICIAN ASSISTANT

## 2021-08-04 RX ORDER — CEPHALEXIN 500 MG/1
500 CAPSULE ORAL EVERY 8 HOURS SCHEDULED
Qty: 21 CAPSULE | Refills: 0 | Status: SHIPPED | OUTPATIENT
Start: 2021-08-04 | End: 2021-08-11

## 2021-08-04 NOTE — PROGRESS NOTES
[facility]        NAME: Oscar Holden is a 80 y o  male  : 1932    MRN: [id number]  DATE: 2021  TIME: 3:49 PM    Assessment and Plan   Rib injury [S29  9XXA]  1  Rib injury  XR ribs left w pa chest min 3 views   2  Skin tear of left elbow without complication, initial encounter  cephalexin (KEFLEX) 500 mg capsule    Tdap Vaccine greater than or equal to 6yo     Skin tear unable to be sutured and covered with Xeroform and gauze  Advised to check and change daily  Will place on Keflex due to large skin tear  Tetanus also updated in office  X-ray shows no acute fractures  Recommended Tylenol and icing as he is unable to take NSAIDs  Patient Instructions     Tylenol for pain   continue to keep wound clean, dry, covered and watch for signs of infection   may ice the chest wall on the left   Keflex as directed for skin tear  Follow up with PCP in 3-5 days  Proceed to  ER if symptoms worsen  Chief Complaint     Chief Complaint   Patient presents with    Rib Injury     left sided rib pain fell today , hurts when taking a deep breath in     Laceration     to left arm         History of Present Illness         Patient is an 42-year-old male brought in today by daughter with complaints of fall  Daughter states he fell on his left side today and now has rib pain and hurts when taking a deep breath in  Did not strike his head or lose consciousness  Also sustained left elbow skin tear, patient is on Eliquis  No shortness of breath  No other injuries  Review of Systems   Review of Systems   Constitutional: Negative for chills and fever  HENT: Negative for congestion and sore throat  Respiratory: Negative for cough and shortness of breath  Cardiovascular: Negative for chest pain  Musculoskeletal: Positive for myalgias  Negative for joint swelling  Skin: Positive for wound  Negative for color change           Current Medications       Current Outpatient Medications:   acetaminophen (TYLENOL) 500 mg tablet, Take 500 mg by mouth 2 (two) times a day, Disp: , Rfl:     ascorbic acid (VITAMIN C) 1000 MG tablet, Take 1,000 mg by mouth daily, Disp: , Rfl:     bimatoprost (LUMIGAN) 0 01 % ophthalmic drops, 1 drop daily at bedtime, Disp: , Rfl:     donepezil (ARICEPT) 10 mg tablet, Take 1 tablet (10 mg total) by mouth daily at bedtime, Disp: 90 tablet, Rfl: 1    Eliquis 2 5 MG, TAKE 1 TABLET BY MOUTH  TWICE DAILY, Disp: 180 tablet, Rfl: 3    torsemide (DEMADEX) 20 mg tablet, TAKE 1 TABLET BY MOUTH  DAILY, Disp: 90 tablet, Rfl: 3    cephalexin (KEFLEX) 500 mg capsule, Take 1 capsule (500 mg total) by mouth every 8 (eight) hours for 7 days, Disp: 21 capsule, Rfl: 0    Current Allergies     Allergies as of 08/04/2021    (No Known Allergies)            The following portions of the patient's history were reviewed and updated as appropriate: allergies, current medications, past family history, past medical history, past social history, past surgical history and problem list      Past Medical History:   Diagnosis Date    Anemia     Atrial fibrillation ([address] )     Hypertension        Past Surgical History:   Procedure Laterality Date    CARDIAC ELECTROPHYSIOLOGY STUDY AND ABLATION      CARDIAC PACEMAKER PLACEMENT  05/2019    OTHER SURGICAL HISTORY      Cancer Surgery and Brachytherapy radioelements per [place]    TONSILLECTOMY         Family History   Family history unknown: Yes         Medications have been verified  Objective   /66   Pulse 79   Temp (!) 97 3 °F (36 3 °C)   Resp 22   Ht 5' 10" (1 778 m)   Wt 70 3 kg (155 lb)   SpO2 97%   BMI 22 24 kg/m²        Physical Exam     Physical Exam  Constitutional:       General: He is not in acute distress  Appearance: Normal appearance  He is not ill-appearing  HENT:      Mouth/Throat:      Mouth: Mucous membranes are moist       Pharynx: Oropharynx is clear     Cardiovascular:      Rate and Rhythm: Normal rate and regular rhythm  Pulmonary:      Effort: Pulmonary effort is normal       Breath sounds: Normal breath sounds  Musculoskeletal:         General: Tenderness present  No swelling, deformity or signs of injury  Left elbow: Laceration present  No swelling, deformity or effusion  Normal range of motion  Tenderness present  Arms:    Skin:     General: Skin is warm and dry  Neurological:      Mental Status: He is alert

## 2021-08-04 NOTE — PATIENT INSTRUCTIONS
Skin Tear   WHAT YOU NEED TO KNOW:   A skin tear occurs when the layers of weakened skin split open from an injury  It is important to treat and prevent skin tears to prevent infection  DISCHARGE INSTRUCTIONS:   Call your doctor if:   · You have a fever or chills  · Blood soaks through your bandage  · You have redness, swelling, pus, or a bad odor coming from your wound  · You have severe pain  · Your wound tears open again  · You have questions or concerns about your condition or care  Medicines:   · Medicines  may be given to decrease pain or treat a bacterial infection  · Take your medicine as directed  Contact your healthcare provider if you think your medicine is not helping or if you have side effects  Tell him of her if you are allergic to any medicine  Keep a list of the medicines, vitamins, and herbs you take  Include the amounts, and when and why you take them  Bring the list or the pill bottles to follow-up visits  Carry your medicine list with you in case of an emergency  Prevent a skin tear:   · Clean, moisturize, and protect your skin  Baths, hot showers, and soap can dry your skin and increase your risk for skin tears  Take lukewarm showers, use mild soap as directed, and gently pat your skin dry  Use lotion to keep your skin moist after you shower  Wear long sleeves, pants, and protective footwear  · Move carefully  Ask for help if you cannot lift yourself  Do not drag your skin when you move  · Keep your home safe  Cover sharp corners, keep your pathways clear, and turn on lights so you can see clearly  Ask for more information if you have questions about home safety  · Drink liquids as directed  Ask your provider how much liquid to drink each day and which liquids are best for you  Liquids will help keep your skin moist and protected from another skin tear  · Eat high-protein foods  to help with wound healing   Examples are lean meats, fish, low-fat dairy products, and beans  Follow up with your healthcare provider as directed:  Write down your questions so you remember to ask them during your visits  © Copyright [employer] 2021 Information is for End User's use only and may not be sold, redistributed or otherwise used for commercial purposes  All illustrations and images included in CareNotes® are the copyrighted property of A [employer] M [employer] Inc  or Umm [employer]   The above information is an  only  It is not intended as medical advice for individual conditions or treatments  Talk to your doctor, nurse or pharmacist before following any medical regimen to see if it is safe and effective for you  Rib Contusion   WHAT YOU NEED TO KNOW:   A rib contusion is a bruise on one or more of your ribs  DISCHARGE INSTRUCTIONS:   Return to the emergency department if:   · You have increased chest pain  · You have shortness of breath  · You start to cough up blood  · Your pain does not improve with pain medicine  Contact your healthcare provider if:   · You have a cough  · You have a fever  · You have questions or concerns about your condition or care  Medicines: You may need any of the following:  · NSAIDs , such as ibuprofen, help decrease swelling, pain, and fever  This medicine is available with or without a doctor's order  NSAIDs can cause stomach bleeding or kidney problems in certain people  If you take blood thinner medicine, always ask if NSAIDs are safe for you  Always read the medicine label and follow directions  Do not give these medicines to children under 10months of age without direction from your child's healthcare provider  · Prescription pain medicine  may be given  Ask how to take this medicine safely  · Take your medicine as directed  Contact your healthcare provider if you think your medicine is not helping or if you have side effects  Tell him of her if you are allergic to any medicine   Keep a list of the medicines, vitamins, and herbs you take  Include the amounts, and when and why you take them  Bring the list or the pill bottles to follow-up visits  Carry your medicine list with you in case of an emergency  Deep breathing:   · To help prevent pneumonia, take 10 deep breaths every hour, even when you wake up during the night  Brace your ribs with your hands or a pillow while you take deep breaths or cough  This will help decrease your pain  · You may need to use an incentive spirometer to help you take deeper breaths  Put the plastic piece into your mouth and take a very deep breath  Hold your breath as long as you can  Then let out your breath  Do this 10 times in a row every hour while you are awake  Rest:  Rest your ribs to decrease swelling and allow the injury to heal faster  Avoid activities that may cause more pain or damage to your ribs  As your pain decreases, begin movements slowly  Ice:  Ice helps decrease swelling and pain  Ice may also help prevent tissue damage  Use an ice pack or put crushed ice in a plastic bag  Cover it with a towel and place it on your bruised area for 15 to 20 minutes every hour as directed  Follow up with your healthcare provider as directed:  Write down your questions so you remember to ask them during your visits  © Copyright [employer] 2021 Information is for End User's use only and may not be sold, redistributed or otherwise used for commercial purposes  All illustrations and images included in CareNotes® are the copyrighted property of [employer]  or Umm Modi   The above information is an  only  It is not intended as medical advice for individual conditions or treatments  Talk to your doctor, nurse or pharmacist before following any medical regimen to see if it is safe and effective for you

## 2021-08-23 ENCOUNTER — TELEPHONE (OUTPATIENT)
Dept: FAMILY MEDICINE CLINIC | Facility: CLINIC | Age: 86
End: 2021-08-23

## 2021-08-23 NOTE — TELEPHONE ENCOUNTER
Patient's wife called with some concerns  She feels that he is not doing well with the new medication that you put him on  donepezil (ARICEPT) 10 mg tablet    She feels that he is getting worse with confusion and memory  He is also loosing weight  She states that if you gave him that test again today he would not do nearly as well as last time  She would like to talk to you about what is going on  She can be reached at [phone]

## 2021-08-27 NOTE — TELEPHONE ENCOUNTER
Returned call and told patient wife to stop his aricept and to increase fluids and po intake to mainatin weight and hydration  Told wife if he stops eating and drinking to call and we would consider hospice care

## 2021-10-12 PROBLEM — F02.80 ALZHEIMER DISEASE (HCC): Status: ACTIVE | Noted: 2021-10-12

## 2021-10-12 PROBLEM — G30.9 ALZHEIMER DISEASE (HCC): Status: ACTIVE | Noted: 2021-10-12

## 2021-10-14 ENCOUNTER — OFFICE VISIT (OUTPATIENT)
Dept: FAMILY MEDICINE CLINIC | Facility: CLINIC | Age: 86
End: 2021-10-14
Payer: MEDICARE

## 2021-10-14 VITALS
RESPIRATION RATE: 20 BRPM | SYSTOLIC BLOOD PRESSURE: 110 MMHG | BODY MASS INDEX: 22.05 KG/M2 | OXYGEN SATURATION: 96 % | HEART RATE: 62 BPM | HEIGHT: 70 IN | TEMPERATURE: 97.5 F | WEIGHT: 154 LBS | DIASTOLIC BLOOD PRESSURE: 62 MMHG

## 2021-10-14 DIAGNOSIS — G47.33 OSA (OBSTRUCTIVE SLEEP APNEA): ICD-10-CM

## 2021-10-14 DIAGNOSIS — I10 ESSENTIAL HYPERTENSION: ICD-10-CM

## 2021-10-14 DIAGNOSIS — K21.9 GASTROESOPHAGEAL REFLUX DISEASE WITHOUT ESOPHAGITIS: Primary | ICD-10-CM

## 2021-10-14 DIAGNOSIS — I48.91 ATRIAL FIBRILLATION, UNSPECIFIED TYPE (HCC): ICD-10-CM

## 2021-10-14 PROCEDURE — 99214 OFFICE O/P EST MOD 30 MIN: CPT | Performed by: FAMILY MEDICINE

## 2021-12-10 DIAGNOSIS — I10 HYPERTENSION, UNSPECIFIED TYPE: ICD-10-CM

## 2021-12-10 DIAGNOSIS — I48.91 ATRIAL FIBRILLATION, UNSPECIFIED TYPE (HCC): ICD-10-CM

## 2021-12-13 RX ORDER — TORSEMIDE 20 MG/1
TABLET ORAL
Qty: 90 TABLET | Refills: 3 | Status: SHIPPED | OUTPATIENT
Start: 2021-12-13

## 2021-12-13 RX ORDER — APIXABAN 2.5 MG/1
TABLET, FILM COATED ORAL
Qty: 180 TABLET | Refills: 3 | Status: SHIPPED | OUTPATIENT
Start: 2021-12-13

## 2022-02-09 ENCOUNTER — OFFICE VISIT (OUTPATIENT)
Dept: FAMILY MEDICINE CLINIC | Facility: CLINIC | Age: 87
End: 2022-02-09
Payer: MEDICARE

## 2022-02-09 VITALS
WEIGHT: 162 LBS | TEMPERATURE: 98.1 F | HEART RATE: 77 BPM | RESPIRATION RATE: 18 BRPM | OXYGEN SATURATION: 96 % | DIASTOLIC BLOOD PRESSURE: 60 MMHG | HEIGHT: 70 IN | BODY MASS INDEX: 23.19 KG/M2 | SYSTOLIC BLOOD PRESSURE: 140 MMHG

## 2022-02-09 DIAGNOSIS — I50.32 CHRONIC DIASTOLIC CONGESTIVE HEART FAILURE (HCC): ICD-10-CM

## 2022-02-09 DIAGNOSIS — K21.9 GASTROESOPHAGEAL REFLUX DISEASE WITHOUT ESOPHAGITIS: Primary | ICD-10-CM

## 2022-02-09 DIAGNOSIS — I48.91 ATRIAL FIBRILLATION, UNSPECIFIED TYPE (HCC): ICD-10-CM

## 2022-02-09 DIAGNOSIS — F02.81 EARLY ONSET ALZHEIMER'S DEMENTIA WITH BEHAVIORAL DISTURBANCE (HCC): ICD-10-CM

## 2022-02-09 DIAGNOSIS — F02.80 ALZHEIMER DISEASE (HCC): ICD-10-CM

## 2022-02-09 DIAGNOSIS — C61 PROSTATE CANCER (HCC): ICD-10-CM

## 2022-02-09 DIAGNOSIS — L03.031 ABSCESS OR CELLULITIS OF TOE, RIGHT: ICD-10-CM

## 2022-02-09 DIAGNOSIS — G30.9 ALZHEIMER DISEASE (HCC): ICD-10-CM

## 2022-02-09 DIAGNOSIS — N18.30 STAGE 3 CHRONIC KIDNEY DISEASE, UNSPECIFIED WHETHER STAGE 3A OR 3B CKD (HCC): ICD-10-CM

## 2022-02-09 DIAGNOSIS — I10 ESSENTIAL HYPERTENSION: ICD-10-CM

## 2022-02-09 DIAGNOSIS — G30.0 EARLY ONSET ALZHEIMER'S DEMENTIA WITH BEHAVIORAL DISTURBANCE (HCC): ICD-10-CM

## 2022-02-09 DIAGNOSIS — J41.0 SIMPLE CHRONIC BRONCHITIS (HCC): ICD-10-CM

## 2022-02-09 DIAGNOSIS — L02.611 ABSCESS OR CELLULITIS OF TOE, RIGHT: ICD-10-CM

## 2022-02-09 PROBLEM — F02.818 EARLY ONSET ALZHEIMER'S DEMENTIA WITH BEHAVIORAL DISTURBANCE (HCC): Status: ACTIVE | Noted: 2022-02-09

## 2022-02-09 PROCEDURE — 99214 OFFICE O/P EST MOD 30 MIN: CPT | Performed by: FAMILY MEDICINE

## 2022-02-09 RX ORDER — CEPHALEXIN 500 MG/1
500 CAPSULE ORAL EVERY 6 HOURS SCHEDULED
Qty: 40 CAPSULE | Refills: 0 | Status: SHIPPED | OUTPATIENT
Start: 2022-02-09 | End: 2022-02-19

## 2022-02-09 NOTE — PROGRESS NOTES
Falls Plan of Care: balance, strength, and gait training instructions were provided  Home safety education provided  Assessment/Plan:         Problem List Items Addressed This Visit        Digestive    Gastroesophageal reflux disease without esophagitis - Primary     Patient to continue with present therapy and decrease caffeine, avoid ETOH and smoking to decrease acid production  Pt should also cease eating prior to bedtime and avoid excessive fluid intake prior to sleep  May use antacids as needed for breakthrough GERD  All pateint questions answered today about this condition  Cardiovascular and Mediastinum    Essential hypertension     Patient is stable with current anti-hypertensive medicine and continue to follow a low sodium diet and take current medication  All questions about this condition were answered today  Atrial fibrillation ([facility] [address] )     Continue with anticogulation and rate control of heart rate  Concerns about condition were addressed today  Nervous and Auditory    Alzheimer disease [facility])     Patient is stable  and will continue present plan of care and reassess at next routine visit  All questions about this problem from patient were answered today  Genitourinary    Prostate cancer [facility])     Patient is stable  and will continue present plan of care and reassess at next routine visit  All questions about this problem from patient were answered today  Subjective:      Patient ID: Yue White is a 80 y o  male  This is an 27-year-old male here today for check up on multiple medical problems patient was hypertension history atrial fibrillation anticoagulation and a problem with his left big toenail  Patient with incomplete partial avulsion of his left big toenail  The foot appears to be somewhat red toe be elevated red does have a little bit of dried blood on the dressing that is covering the head    Patient is taking Eliquis which explains some of the bleeding  There is no christopher pus have no but it does look is getting infected was treated with some antibiotics  Patient will be in need of seeing put a podiatric care to further take care of the nail problem he has  The following portions of the patient's history were reviewed and updated as appropriate:   Past Medical History:  He has a past medical history of Anemia, Atrial fibrillation ([address] ), and Hypertension  ,  _______________________________________________________________________  Medical Problems:  does not have any pertinent problems on file ,  _______________________________________________________________________  Past Surgical History:   has a past surgical history that includes Cardiac electrophysiology study and ablation; Tonsillectomy; Cardiac pacemaker placement (05/2019); and Other surgical history  ,  _______________________________________________________________________  Family History:  Family history is unknown by patient  ,  _______________________________________________________________________  Social History:   reports that he has never smoked  He has never used smokeless tobacco  He reports current alcohol use  No history on file for drug use ,  _______________________________________________________________________  Allergies:  has No Known Allergies     _______________________________________________________________________  Current Outpatient Medications   Medication Sig Dispense Refill    acetaminophen (TYLENOL) 500 mg tablet Take 500 mg by mouth 2 (two) times a day      ascorbic acid (VITAMIN C) 1000 MG tablet Take 1,000 mg by mouth daily      bimatoprost (LUMIGAN) 0 01 % ophthalmic drops 1 drop daily at bedtime      Eliquis 2 5 MG TAKE 1 TABLET BY MOUTH  TWICE DAILY 180 tablet 3    mupirocin (BACTROBAN) 2 % ointment       torsemide (DEMADEX) 20 mg tablet TAKE 1 TABLET BY MOUTH  DAILY 90 tablet 3     No current facility-administered medications for this visit      _______________________________________________________________________  Review of Systems   Constitutional: Negative for activity change, appetite change, chills, fatigue, fever and unexpected weight change  HENT: Negative for congestion, ear pain, hearing loss, mouth sores, postnasal drip, sinus pressure, sinus pain, sneezing and sore throat  Respiratory: Negative for apnea, cough, shortness of breath and wheezing  Cardiovascular: Negative for chest pain, palpitations and leg swelling  Gastrointestinal: Negative for abdominal pain, constipation, diarrhea, nausea and vomiting  Endocrine: Negative for cold intolerance and heat intolerance  Genitourinary: Negative for dysuria, frequency and hematuria  Musculoskeletal: Negative for arthralgias, back pain, gait problem, joint swelling and neck pain  Skin: Negative for rash  Neurological: Negative for dizziness, weakness and numbness  Hematological: Does not bruise/bleed easily  Psychiatric/Behavioral: Negative for agitation, behavioral problems, confusion, hallucinations and sleep disturbance  The patient is not nervous/anxious  Objective: There were no vitals filed for this visit  There is no height or weight on file to calculate BMI  Physical Exam  Vitals and nursing note reviewed  Constitutional:       Appearance: He is well-developed  HENT:      Head: Normocephalic and atraumatic  Nose: Nose normal    Eyes:      General: No scleral icterus  Conjunctiva/sclera: Conjunctivae normal       Pupils: Pupils are equal, round, and reactive to light  Neck:      Thyroid: No thyromegaly  Cardiovascular:      Rate and Rhythm: Normal rate and regular rhythm  Heart sounds: Normal heart sounds  Pulmonary:      Effort: Pulmonary effort is normal  No respiratory distress  Breath sounds: Normal breath sounds  No wheezing  Abdominal:      General: Bowel sounds are normal       Palpations: Abdomen is soft  Tenderness: There is no abdominal tenderness  There is no guarding or rebound  Musculoskeletal:         General: Normal range of motion  Cervical back: Normal range of motion and neck supple  Feet:    Feet:      Comments: Erythema in toenail  Skin:     General: Skin is warm and dry  Findings: No rash  Neurological:      Mental Status: He is alert and oriented to person, place, and time     Psychiatric:         Behavior: Behavior normal

## 2022-04-01 ENCOUNTER — RA CDI HCC (OUTPATIENT)
Dept: OTHER | Facility: HOSPITAL | Age: 87
End: 2022-04-01

## 2022-04-01 NOTE — PROGRESS NOTES
[facility] [place] [facility]  coding opportunities     I13 [id number]     Chart Reviewed number of suggestions sent to Provider: 1     Patients Insurance     Medicare Insurance: [employer]

## 2022-07-15 ENCOUNTER — TELEPHONE (OUTPATIENT)
Dept: FAMILY MEDICINE CLINIC | Facility: CLINIC | Age: 87
End: 2022-07-15

## 2022-07-15 NOTE — TELEPHONE ENCOUNTER
Patient is scheduled for an in home visit with you in September however the patient's health is declining and his family is concerned  I do not have a POA on file, this is what theyre trying to obtain and theyre looking for some type of guidance of what can be done and if he could have a sooner house call visit?

## 2022-07-22 NOTE — TELEPHONE ENCOUNTER
Family called again and asked wether this appt can be moved up  Pt health is declining  Please review and advise

## 2022-07-25 ENCOUNTER — OFFICE VISIT (OUTPATIENT)
Dept: FAMILY MEDICINE CLINIC | Facility: CLINIC | Age: 87
End: 2022-07-25
Payer: MEDICARE

## 2022-07-25 ENCOUNTER — TELEPHONE (OUTPATIENT)
Dept: FAMILY MEDICINE CLINIC | Facility: CLINIC | Age: 87
End: 2022-07-25

## 2022-07-25 DIAGNOSIS — Z00.00 MEDICARE ANNUAL WELLNESS VISIT, SUBSEQUENT: ICD-10-CM

## 2022-07-25 DIAGNOSIS — F02.818 EARLY ONSET ALZHEIMER'S DEMENTIA WITH BEHAVIORAL DISTURBANCE (HCC): ICD-10-CM

## 2022-07-25 DIAGNOSIS — Z00.00 WELL ADULT EXAM: ICD-10-CM

## 2022-07-25 DIAGNOSIS — F02.80 ALZHEIMER DISEASE (HCC): ICD-10-CM

## 2022-07-25 DIAGNOSIS — Z95.0 PACEMAKER: ICD-10-CM

## 2022-07-25 DIAGNOSIS — N18.30 STAGE 3 CHRONIC KIDNEY DISEASE, UNSPECIFIED WHETHER STAGE 3A OR 3B CKD (HCC): ICD-10-CM

## 2022-07-25 DIAGNOSIS — G30.9 ALZHEIMER DISEASE (HCC): ICD-10-CM

## 2022-07-25 DIAGNOSIS — K21.9 GASTROESOPHAGEAL REFLUX DISEASE WITHOUT ESOPHAGITIS: Primary | ICD-10-CM

## 2022-07-25 DIAGNOSIS — G47.33 OSA (OBSTRUCTIVE SLEEP APNEA): ICD-10-CM

## 2022-07-25 DIAGNOSIS — I48.91 ATRIAL FIBRILLATION, UNSPECIFIED TYPE (HCC): ICD-10-CM

## 2022-07-25 DIAGNOSIS — C61 PROSTATE CANCER (HCC): ICD-10-CM

## 2022-07-25 DIAGNOSIS — I10 ESSENTIAL HYPERTENSION: ICD-10-CM

## 2022-07-25 DIAGNOSIS — G30.0 EARLY ONSET ALZHEIMER'S DEMENTIA WITH BEHAVIORAL DISTURBANCE (HCC): ICD-10-CM

## 2022-07-25 PROCEDURE — G0439 PPPS, SUBSEQ VISIT: HCPCS | Performed by: FAMILY MEDICINE

## 2022-07-25 PROCEDURE — 99349 HOME/RES VST EST MOD MDM 40: CPT | Performed by: FAMILY MEDICINE

## 2022-07-26 VITALS
HEART RATE: 82 BPM | TEMPERATURE: 98.4 F | RESPIRATION RATE: 16 BRPM | DIASTOLIC BLOOD PRESSURE: 62 MMHG | SYSTOLIC BLOOD PRESSURE: 118 MMHG

## 2022-07-26 DIAGNOSIS — L98.9 SKIN ABNORMALITY: Primary | ICD-10-CM

## 2022-07-26 DIAGNOSIS — L98.9 SKIN ABNORMALITY: ICD-10-CM

## 2022-07-26 RX ORDER — ZINC OXIDE/PANTHENOL/VITAMIN E 11.3%
CREAM (GRAM) TOPICAL 2 TIMES DAILY
Qty: 113 G | Refills: 5 | Status: SHIPPED | OUTPATIENT
Start: 2022-07-26 | End: 2022-07-26 | Stop reason: SDUPTHER

## 2022-07-26 RX ORDER — ZINC OXIDE/PANTHENOL/VITAMIN E 11.3%
CREAM (GRAM) TOPICAL 2 TIMES DAILY
Qty: 113 G | Refills: 5 | Status: SHIPPED | OUTPATIENT
Start: 2022-07-26

## 2022-07-27 ENCOUNTER — PATIENT OUTREACH (OUTPATIENT)
Dept: FAMILY MEDICINE CLINIC | Facility: CLINIC | Age: 87
End: 2022-07-27

## 2022-07-27 NOTE — PATIENT INSTRUCTIONS
Medicare Preventive Visit Patient Instructions  Thank you for completing your Welcome to Medicare Visit or Medicare Annual Wellness Visit today  Your next wellness visit will be due in one year (7/27/2023)  The screening/preventive services that you may require over the next 5-10 years are detailed below  Some tests may not apply to you based off risk factors and/or age  Screening tests ordered at today's visit but not completed yet may show as past due  Also, please note that scanned in results may not display below  Preventive Screenings:  Service Recommendations Previous Testing/Comments   Colorectal Cancer Screening  · Colonoscopy    · Fecal Occult Blood Test (FOBT)/Fecal Immunochemical Test (FIT)  · Fecal DNA/Cologuard Test  · Flexible Sigmoidoscopy Age: 54-65 years old   Colonoscopy: every 10 years (May be performed more frequently if at higher risk)  OR  FOBT/FIT: every 1 year  OR  Cologuard: every 3 years  OR  Sigmoidoscopy: every 5 years  Screening may be recommended earlier than age 48 if at higher risk for colorectal cancer  Also, an individualized decision between you and your healthcare provider will decide whether screening between the ages of 74-80 would be appropriate   Colonoscopy: Not on file  FOBT/FIT: Not on file  Cologuard: Not on file  Sigmoidoscopy: Not on file    Screening Not Indicated     Prostate Cancer Screening Individualized decision between patient and health care provider in men between ages of 53-78   Medicare will cover every 12 months beginning on the day after your 50th birthday PSA: No results in last 5 years     History Prostate Cancer  Screening Not Indicated     Hepatitis C Screening Once for adults born between [birth year] and 1965  More frequently in patients at high risk for Hepatitis C Hep C Antibody: Not on file        Diabetes Screening 1-2 times per year if you're at risk for diabetes or have pre-diabetes Fasting glucose: 91 mg/dL   A1C: No results in last 5 years Cholesterol Screening Once every 5 years if you don't have a lipid disorder  May order more often based on risk factors  Lipid panel: 06/21/2021    Screening Current      Other Preventive Screenings Covered by Medicare:  1  Abdominal Aortic Aneurysm (AAA) Screening: covered once if your at risk  You're considered to be at risk if you have a family history of AAA or a male between the age of 73-68 who smoking at least 100 cigarettes in your lifetime  2  Lung Cancer Screening: covers low dose CT scan once per year if you meet all of the following conditions: (1) Age 50-69; (2) No signs or symptoms of lung cancer; (3) Current smoker or have quit smoking within the last 15 years; (4) You have a tobacco smoking history of at least 30 pack years (packs per day x number of years you smoked); (5) You get a written order from a healthcare provider  3  Glaucoma Screening: covered annually if you're considered high risk: (1) You have diabetes OR (2) Family history of glaucoma OR (3)  aged 48 and older OR (3)  American aged 72 and older  3  Osteoporosis Screening: covered every 2 years if you meet one of the following conditions: (1) Have a vertebral abnormality; (2) On glucocorticoid therapy for more than 3 months; (3) Have primary hyperparathyroidism; (4) On osteoporosis medications and need to assess response to drug therapy  5  HIV Screening: covered annually if you're between the age of 12-76  Also covered annually if you are younger than 13 and older than 72 with risk factors for HIV infection  For pregnant patients, it is covered up to 3 times per pregnancy      Immunizations:  Immunization Recommendations   Influenza Vaccine Annual influenza vaccination during flu season is recommended for all persons aged >= 6 months who do not have contraindications   Pneumococcal Vaccine (Prevnar and Pneumovax)  * Prevnar = PCV13  * Pneumovax = PPSV23 Adults 25-60 years old: 1-3 doses may be recommended based on certain risk factors  Adults 72 years old: Prevnar (PCV13) vaccine recommended followed by Pneumovax (PPSV23) vaccine  If already received PPSV23 since turning 65, then PCV13 recommended at least one year after PPSV23 dose  Hepatitis B Vaccine 3 dose series if at intermediate or high risk (ex: diabetes, end stage renal disease, liver disease)   Tetanus (Td) Vaccine - COST NOT COVERED BY MEDICARE PART B Following completion of primary series, a booster dose should be given every 10 years to maintain immunity against tetanus  Td may also be given as tetanus wound prophylaxis  Tdap Vaccine - COST NOT COVERED BY MEDICARE PART B Recommended at least once for all adults  For pregnant patients, recommended with each pregnancy  Shingles Vaccine (Shingrix) - COST NOT COVERED BY MEDICARE PART B  2 shot series recommended in those aged 48 and above     Health Maintenance Due:  There are no preventive care reminders to display for this patient  Immunizations Due:      Topic Date Due    Pneumococcal Vaccine: 65+ Years (1 - PCV) Never done    COVID-19 Vaccine (4 - Booster for Pfizer series) 04/20/2022    Influenza Vaccine (1) 09/01/2022     Advance Directives   What are advance directives? Advance directives are legal documents that state your wishes and plans for medical care  These plans are made ahead of time in case you lose your ability to make decisions for yourself  Advance directives can apply to any medical decision, such as the treatments you want, and if you want to donate organs  What are the types of advance directives? There are many types of advance directives, and each state has rules about how to use them  You may choose a combination of any of the following:  · Living will: This is a written record of the treatment you want  You can also choose which treatments you do not want, which to limit, and which to stop at a certain time   This includes surgery, medicine, IV fluid, and tube feedings  · Durable power of  for healthcare [facility]): This is a written record that states who you want to make healthcare choices for you when you are unable to make them for yourself  This person, called a proxy, is usually a family member or a friend  You may choose more than 1 proxy  · Do not resuscitate (DNR) order:  A DNR order is used in case your heart stops beating or you stop breathing  It is a request not to have certain forms of treatment, such as CPR  A DNR order may be included in other types of advance directives  · Medical directive: This covers the care that you want if you are in a coma, near death, or unable to make decisions for yourself  You can list the treatments you want for each condition  Treatment may include pain medicine, surgery, blood transfusions, dialysis, IV or tube feedings, and a ventilator (breathing machine)  · Values history: This document has questions about your views, beliefs, and how you feel and think about life  This information can help others choose the care that you would choose  Why are advance directives important? An advance directive helps you control your care  Although spoken wishes may be used, it is better to have your wishes written down  Spoken wishes can be misunderstood, or not followed  Treatments may be given even if you do not want them  An advance directive may make it easier for your family to make difficult choices about your care  © Copyright [employer] 2018 Information is for End User's use only and may not be sold, redistributed or otherwise used for commercial purposes   All illustrations and images included in CareNotes® are the copyrighted property of A D A M [employer] Inc  or [address] [place], [place] [id number]

## 2022-07-27 NOTE — PROGRESS NOTES
Assessment and Plan:     Problem List Items Addressed This Visit        Digestive    Gastroesophageal reflux disease without esophagitis - Primary     Patient to continue with present therapy and decrease caffeine, avoid ETOH and smoking to decrease acid production  Pt should also cease eating prior to bedtime and avoid excessive fluid intake prior to sleep  May use antacids as needed for breakthrough GERD  All pateint questions answered today about this condition  Respiratory    CARROLL (obstructive sleep apnea)     Patient instructed to continue using CPAP as directed to decrease episodes of apnea to minimize risk of cardiac complications  Pt instructed to kep  machine in clean working order and to call if any replacement parts are needed  Cardiovascular and Mediastinum    Essential hypertension     Patient is stable with current anti-hypertensive medicine and continue to follow a low sodium diet and take current medication  All questions about this condition were answered today  Atrial fibrillation ([facility] )     Continue with anticogulation and rate control of heart rate  Concerns about condition were addressed today  Nervous and Auditory    Alzheimer disease [facility])     Patient has severe dementia and is not able to care for self at all and needs heavy care  Pt needs a lot of help with their ADLs by staff  Genitourinary    Stage 3 chronic kidney disease ([facility] )     Lab Results   Component Value Date    EGFR 32 06/21/2021    CREATININE 1 84 (H) 06/21/2021    CREATININE 1 48 (H) 10/20/2014   Pt to avoid NSAIDs and any IV dyes  Patient to follow up eoither with nephrology or  with us for  further  monitoring of  renal function  Prostate cancer [facility])     Patient is stable  and will continue present plan of care and reassess at next routine visit  All questions about this problem from patient were answered today              Other    Pacemaker     As per cardiology Preventive health issues were discussed with patient, and age appropriate screening tests were ordered as noted in patient's After Visit Summary  Personalized health advice and appropriate referrals for health education or preventive services given if needed, as noted in patient's After Visit Summary       History of Present Illness:     Patient presents for a Medicare Wellness Visit    HPI   Patient Care Team:  Odin Anderson MD as PCP - General (Family Medicine)     Review of Systems:     Review of Systems     Problem List:     Patient Active Problem List   Diagnosis    Stage 3 chronic kidney disease ([facility] )    CARROLL (obstructive sleep apnea)    Pacemaker    Prostate cancer ([facility] )    Essential hypertension    Gastroesophageal reflux disease without esophagitis    Atrial fibrillation (HCC)    Chronic diastolic congestive heart failure (HCC)    Simple chronic bronchitis ([facility] )    [facility] disease ([facility] )    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 ([facility] )      Past Medical and Surgical History:     Past Medical History:   Diagnosis Date    Anemia     Atrial fibrillation ([facility] )     Hypertension      Past Surgical History:   Procedure Laterality Date    CARDIAC ELECTROPHYSIOLOGY STUDY AND ABLATION      CARDIAC PACEMAKER PLACEMENT  05/2019    OTHER SURGICAL HISTORY      Cancer Surgery and Brachytherapy radioelements per [place]    TONSILLECTOMY        Family History:     Family History   Family history unknown: Yes      Social History:     Social History     Socioeconomic History    Marital status: /Civil Union     Spouse name: None    Number of children: None    Years of education: None    Highest education level: None   Occupational History    Occupation: Retired   Tobacco Use    Smoking status: Never Smoker    Smokeless tobacco: Never Used   Vaping Use    Vaping Use: Never used   Substance and Sexual Activity    Alcohol use: Yes     Comment: occasional    Drug use: None    Sexual activity: None   Other Topics Concern    None   Social History Narrative    · Most recent tobacco use screenin2020      · Do you currently or have you served in the [employer]:   No      · Were you activated, into active duty, as a member of the [employer] or as a Reservist:   No      · Exercise level: Moderate      · Diet:   Regular      · General stress level:   Medium      · Alcohol intake:   Occasional      · Caffeine intake:   Heavy      · Seat belts used routinely:   Yes      · Smoke alarm in home: Yes      · Advance directive:   Yes      Social Determinants of Health     Financial Resource Strain: Not on file   Food Insecurity: Not on file   Transportation Needs: Not on file   Physical Activity: Not on file   Stress: Not on file   Social Connections: Not on file   Intimate Partner Violence: Not on file   Housing Stability: Not on file      Medications and Allergies:     Current Outpatient Medications   Medication Sig Dispense Refill    acetaminophen (TYLENOL) 500 mg tablet Take 500 mg by mouth 2 (two) times a day      ascorbic acid (VITAMIN C) 1000 MG tablet Take 1,000 mg by mouth daily      bimatoprost (LUMIGAN) 0 01 % ophthalmic drops 1 drop daily at bedtime      Eliquis 2 5 MG TAKE 1 TABLET BY MOUTH  TWICE DAILY 180 tablet 3    mupirocin (BACTROBAN) 2 % ointment       torsemide (DEMADEX) 20 mg tablet TAKE 1 TABLET BY MOUTH  DAILY 90 tablet 3    zinc oxide (Balmex Complete Protection) 11 3 % cream Apply topically 2 (two) times a day 113 g 5     No current facility-administered medications for this visit  No Known Allergies   Immunizations:     Immunization History   Administered Date(s) Administered    COVID-19 PFIZER VACCINE 0 3 ML IM 2021, 2021, 2021    Tdap 2021      Health Maintenance: There are no preventive care reminders to display for this patient        Topic Date Due    Pneumococcal Vaccine: 65+ Years (1 - PCV) Never done    COVID-19 Vaccine (4 - Booster for Pfizer series) 04/20/2022    Influenza Vaccine (1) 09/01/2022      Medicare Screening Tests and Risk Assessments:     Virgia Heimlich is here for his Subsequent Wellness visit  Last Medicare Wellness visit information reviewed, patient interviewed, no change since last AWV  Historian  Patient cannot answer questions due to cognitive impairment, intelluctual disability, or expressive limitations  Information provided by: family  Health Risk Assessment:   Patient rates overall health as fair  Patient feels that their physical health rating is slightly worse  Patient is dissatisfied with their life  Eyesight was rated as same  Hearing was rated as same  Patient feels that their emotional and mental health rating is much worse  Patient states they are always unusually tired/fatigued  Pain experienced in the last 7 days has been none  Patient states that he has experienced no weight loss or gain in last 6 months  Home Safety:  Patient has trouble with stairs inside or outside of their home  Patient has working smoke alarms and has working carbon monoxide detector  Nutrition:   Current diet is Low Cholesterol and Low Saturated Fat  Medications:   Patient is not currently taking any over-the-counter supplements  Patient is not able to manage medications  Activities of Daily Living (ADLs)/Instrumental Activities of Daily Living (IADLs):   Walk and transfer into and out of bed and chair?: No  Dress and groom yourself?: No    Bathe or shower yourself?: No    Feed yourself?  Yes  Do your laundry/housekeeping?: No  Manage your money, pay your bills and track your expenses?: No  Make your own meals?: No    Do your own shopping?: No    Previous Hospitalizations:   Any hospitalizations or ED visits within the last 12 months?: No      Advance Care Planning:   Living will: Yes    Advanced directive: Yes      Cognitive Screening:   Provider or family/friend/caregiver concerned regarding cognition?: Yes    PREVENTIVE SCREENINGS      Cardiovascular Screening:    General: Screening Current      Colorectal Cancer Screening:     General: Screening Not Indicated      Prostate Cancer Screening:    General: History Prostate Cancer and Screening Not Indicated      Lung Cancer Screening:     General: Screening Not Indicated    Screening, Brief Intervention, and Referral to Treatment (SBIRT)    Screening  Typical number of drinks in a day: 0  Typical number of drinks in a week: 0  Interpretation: Low risk drinking behavior      No exam data present     Physical Exam:     /62 (BP Location: Right arm)   Pulse 82   Temp 98 4 °F (36 9 °C)   Resp 16     Physical Exam     Deven Ann MD

## 2022-07-27 NOTE — ASSESSMENT & PLAN NOTE
Patient has severe dementia and is not able to care for self at all and needs heavy care  Pt needs a lot of help with their ADLs by staff

## 2022-07-27 NOTE — PROGRESS NOTES
OPCM SW received referral from PCP to assist with community services and in home needs  Chart review completed and SW contacted wife for assessment of needs  Wife reports that patient can shower independently; however, needs help getting into and out of shower as well as drying and dressing  Wife is able to assist with showering 1x a week  Wife reports that daughter who resides next door does the house cleaning, grocery shopping, trash, and picks up the mail along with assisting both patient and wife with other needs as able  Son and DIL from [place] come monthly to deep clean house and provide maintenance for the home as needed  Wife reports doing laundry 1x a week  Wife reports managing all meds  Patient uses rolling walker to ambulate  Patient uses CPAP at night  Wife reports that patient and wife both sleep in lift chairs  Patient does not have recall of time of day and is frequently awake in the early morning hours while sleeping more during the day  Patient does wake wife when toileting at night to assist with placing CPAP back on  Wife reports that patient is having more difficulty with cleaning self after bowel movements  Wife does keep to a routine to help manage patient's dementia  Wife states that patient's personality has not changed  Wife reports that patient will eat 2 meals per day, breakfast and dinner which will include meat/fish, veggies, and a starch  Wife is concerned as she gets tired and then becomes unsteady  Wife does wear medical alert  Wife reports that patient and wife do not leave the home often as daughter does grocery shopping and PCP is seeing patient in the home  Wife reports that patient received monthly SS $1400 and monthly pension $[phone]  Wife receives monthly SS of $400  Patient and wife receive a monthly payment from a joint investment of $201  Patient and wife have long term insurance which begins after 100 days of long term care  Wife reports having an annuity to cover this 100 days  Annuity is reported to be $80,000  Patient and wife also have 2 savings accounts totaling more that $8000  Wife reports that after LTI payment and real estate taxes, most of that savings will be gone  Patient would not be eligible for [place] services due to greater than $8000 in assets and patient's monthly income being over eligibility guidelines  SW discussed referral to Aging office for Options program which wife agreed to  SW also discussed private pay services to assist with patient's care  [employer] referral made to provide options for care at home  SW also discussed SeniorLife program   Wife would like to keep patient's PCP but is interested in more information if it would mean patient would be able to remain in the home  Wife reports that patient has expressed his wanting to remain in the home as well as the family wanting the same  OPCM ASTER providing information on [employer], Seniors Helping Seniors, [employer] Care, [employer] by mail  Aging and [employer] referrals made  ASTER advised that family should discuss resources and be part of meetings with [employer] to assist with making the appropriate arrangements for patient  ASTER to follow up in a week

## 2022-07-27 NOTE — PROGRESS NOTES
Depression Screening and Follow-up Plan: Patient was screened for depression during today's encounter  They screened negative with a PHQ-2 score of 2  Falls Plan of Care: balance, strength, and gait training instructions were provided  Recommended assistive device to help with gait and balance  Home safety education provided  Assessment/Plan:         Problem List Items Addressed This Visit        Digestive    Gastroesophageal reflux disease without esophagitis - Primary     Patient to continue with present therapy and decrease caffeine, avoid ETOH and smoking to decrease acid production  Pt should also cease eating prior to bedtime and avoid excessive fluid intake prior to sleep  May use antacids as needed for breakthrough GERD  All pateint questions answered today about this condition  Respiratory    CARROLL (obstructive sleep apnea)     Patient instructed to continue using CPAP as directed to decrease episodes of apnea to minimize risk of cardiac complications  Pt instructed to kep  machine in clean working order and to call if any replacement parts are needed  Cardiovascular and Mediastinum    Essential hypertension     Patient is stable with current anti-hypertensive medicine and continue to follow a low sodium diet and take current medication  All questions about this condition were answered today  Atrial fibrillation ([facility] )     Continue with anticogulation and rate control of heart rate  Concerns about condition were addressed today  Nervous and Auditory    Alzheimer disease [facility])     Patient has severe dementia and is not able to care for self at all and needs heavy care  Pt needs a lot of help with their ADLs by staff  Genitourinary    Stage 3 chronic kidney disease ([facility] )     Lab Results   Component Value Date    EGFR 32 06/21/2021    CREATININE 1 84 (H) 06/21/2021    CREATININE 1 48 (H) 10/20/2014   Pt to avoid NSAIDs and any IV dyes   Patient to follow up eoither with nephrology or  with us for  further  monitoring of  renal function  Prostate cancer [facility])     Patient is stable  and will continue present plan of care and reassess at next routine visit  All questions about this problem from patient were answered today  Other    Pacemaker     As per cardiology                 Subjective:      Patient ID: Mai Montejo is a 80 y o  male  This is a 69-year-old male seen examined for his Medicare wellness as well as a house call for his multiple medical problems  Patient has history of atrial fibrillation gait dysfunction dementia that is getting worse  He also has some irritation on his backside his family is concerned about having decubitus  His medicines are stable they are asking for cream for his backside of alcohol some Balmex hands and  The patient was accompanied by his wife his daughter and daughter-in-law  Patient has significant dementia did not do much speaking and most of the interview was with the 3 lady's are concerned about this patient  Patient's wife is elderly herself and taking care of his patient is becoming too much for her  The daughter lives nearby and she helps out a daughter-in-law however is not is in the middle of [place] and abelardo present to help out  Discussed with the family about getting  involved to see about the Senior Care program is available for that if not whether kind of services could do white get to help her with some respite care  Patient also will be a great candidate for palliative care or he has dementia he also has atrial fibrillation and other medical problems  Is the patient is not agitated today and does follow commands quite easily  His medicines have been reviewed today  The patient has a scheduled appointment on September ready    She      The following portions of the patient's history were reviewed and updated as appropriate:   Past Medical History:  He has a past medical history of Anemia, Atrial fibrillation ([address] ), and Hypertension  ,  _______________________________________________________________________  Medical Problems:  does not have any pertinent problems on file ,  _______________________________________________________________________  Past Surgical History:   has a past surgical history that includes Cardiac electrophysiology study and ablation; Tonsillectomy; Cardiac pacemaker placement (05/2019); and Other surgical history  ,  _______________________________________________________________________  Family History:  Family history is unknown by patient  ,  _______________________________________________________________________  Social History:   reports that he has never smoked  He has never used smokeless tobacco  He reports current alcohol use  No history on file for drug use ,  _______________________________________________________________________  Allergies:  has No Known Allergies     _______________________________________________________________________  Current Outpatient Medications   Medication Sig Dispense Refill    acetaminophen (TYLENOL) 500 mg tablet Take 500 mg by mouth 2 (two) times a day      ascorbic acid (VITAMIN C) 1000 MG tablet Take 1,000 mg by mouth daily      bimatoprost (LUMIGAN) 0 01 % ophthalmic drops 1 drop daily at bedtime      Eliquis 2 5 MG TAKE 1 TABLET BY MOUTH  TWICE DAILY 180 tablet 3    mupirocin (BACTROBAN) 2 % ointment       torsemide (DEMADEX) 20 mg tablet TAKE 1 TABLET BY MOUTH  DAILY 90 tablet 3    zinc oxide (Balmex Complete Protection) 11 3 % cream Apply topically 2 (two) times a day 113 g 5     No current facility-administered medications for this visit      _______________________________________________________________________  Review of Systems   Unable to perform ROS: Dementia         Objective:  Vitals:    07/25/22 2221   BP: 118/62   BP Location: Right arm   Pulse: 82   Resp: 16   Temp: 98 4 °F (36 9 °C) There is no height or weight on file to calculate BMI  Physical Exam  Vitals and nursing note reviewed  Constitutional:       Appearance: He is well-developed  He is not ill-appearing  Comments: frail   HENT:      Head: Normocephalic and atraumatic  Right Ear: Tympanic membrane normal       Left Ear: Tympanic membrane normal       Nose: Nose normal       Mouth/Throat:      Mouth: Mucous membranes are moist    Eyes:      General: No scleral icterus  Conjunctiva/sclera: Conjunctivae normal       Pupils: Pupils are equal, round, and reactive to light  Neck:      Thyroid: No thyromegaly  Cardiovascular:      Rate and Rhythm: Normal rate  Rhythm irregular  Heart sounds: Normal heart sounds  Pulmonary:      Effort: Pulmonary effort is normal  No respiratory distress  Breath sounds: Normal breath sounds  No wheezing  Abdominal:      General: Bowel sounds are normal       Palpations: Abdomen is soft  Tenderness: There is no abdominal tenderness  There is no guarding or rebound  Musculoskeletal:         General: Normal range of motion  Cervical back: Normal range of motion and neck supple  Skin:     General: Skin is warm and dry  Findings: Erythema present  No rash  Comments: Stage 1 decubitus   Neurological:      Mental Status: He is alert  Mental status is at baseline  He is disoriented  Motor: Weakness present        Gait: Gait abnormal

## 2022-07-27 NOTE — ASSESSMENT & PLAN NOTE
Lab Results   Component Value Date    EGFR 32 06/21/2021    CREATININE 1 84 (H) 06/21/2021    CREATININE 1 48 (H) 10/20/2014   Pt to avoid NSAIDs and any IV dyes  Patient to follow up eoither with nephrology or  with us for  further  monitoring of  renal function

## 2022-08-08 ENCOUNTER — TELEPHONE (OUTPATIENT)
Dept: FAMILY MEDICINE CLINIC | Facility: CLINIC | Age: 87
End: 2022-08-08

## 2022-08-08 ENCOUNTER — PATIENT OUTREACH (OUTPATIENT)
Dept: FAMILY MEDICINE CLINIC | Facility: CLINIC | Age: 87
End: 2022-08-08

## 2022-08-08 DIAGNOSIS — M19.90 ARTHRITIS: ICD-10-CM

## 2022-08-08 DIAGNOSIS — R26.9 GAIT ABNORMALITY: Primary | ICD-10-CM

## 2022-08-08 NOTE — TELEPHONE ENCOUNTER
[facility] called requesting orders for PT and OT for patient due to dementia   Fax [phone]

## 2022-08-08 NOTE — PROGRESS NOTES
OPCM SW spoke with wife who reports that [facility] to visit with patient and family  Family feels that privately paying for assistance in the home is not affordable at this time  Wife feels that the only need at this time is assistance for showering of patient  [facility] discussed PT/OT with [facility]  Arlis Pallas from [facility] has been in contact with wife and was to contact Dr Basil Delgado for orders for both patient and wife for services  Wife also reports that daughter in law is going to discuss palliative care for patient with Dr Basil MICHAEL is also attempting to locate the [facility] insurance plan and is working with the local [employer] office to get a copy and determine what type of care is covered  Wife is hoping that personal care to remain in the home rather than going to a nursing home or [facility] would be covered  Wife reports that son ALEC also brought a lift chair for patient which patient seems more comfortable in  Wife has not received packet of information mailed on 7/28  SW will outreach family again in about 1 week to follow up on the mailing, if not received will obtain email address for one of the children and email resources  Wife is agreeable

## 2022-08-08 NOTE — PROGRESS NOTES
Phone call from Araseli Crowley reporting that policy from [facility] will not arrive for about 3 weeks as it has to come from the main office  Agent is fairly certain that policy covers nursing home care only, but will wait for full policy to arrive to review  DIL has left message for Dr Jag Pfeiffer about palliative care  ALEC confirmed that Jose A Randall is working on PT/OT from [facility]  ALEC wanted to review the guidelines for waiver services and the 5 year look back along with "spending down"  ALEC reports that she is waiting for an elder law  to contact her regarding options  Options at this time are private pay as patient is over the income and asset guidelines for waiver or possible palliative care for additional services

## 2022-08-09 ENCOUNTER — TELEPHONE (OUTPATIENT)
Dept: FAMILY MEDICINE CLINIC | Facility: CLINIC | Age: 87
End: 2022-08-09

## 2022-08-09 NOTE — TELEPHONE ENCOUNTER
Triny Fischer called, she said she needs the PT and OT orders to be for in home services for [place] and herself

## 2022-08-23 ENCOUNTER — PATIENT OUTREACH (OUTPATIENT)
Dept: FAMILY MEDICINE CLINIC | Facility: CLINIC | Age: 87
End: 2022-08-23

## 2022-08-23 ENCOUNTER — TELEPHONE (OUTPATIENT)
Dept: FAMILY MEDICINE CLINIC | Facility: CLINIC | Age: 87
End: 2022-08-23

## 2022-08-23 NOTE — PROGRESS NOTES
OPCM ASTER placed phone call to wife who reports patient is having some decline  Patient continues to need assistance with bathing and now dressing  [facility]  Wife is finding it more difficult to make meals as she tires after doing so   reports that the long term insurance will only cover facility care  The policy does not cover in home care  Wife reports that they have found an [facility] in [facility], and is putting in an application for both patient and wife  There is a 6 month wait with 8 people ahead of them on the list  Patient and wife would be required to pay the first 100 days at the facility which they have covered with the annuity  After this the long term insurance will pay at 60% which they would be able to afford  Wife denied MOW referral       Wife reports that they are not able to afford to pay for in home services as this would make them unable to pay for the [facility]  Alyse Bennett is helping with finding the care and managing resources  Wife reports that she has not received the mailing of resources  ASTER attempted to reach out to [facility] for email to provide resources that my be able to help on volunteer basis  Voicemail left with ASTER contact information for return call  Wife plans to speak with Dr Natasha Oh about palliative care consult for patient

## 2022-08-23 NOTE — TELEPHONE ENCOUNTER
Patient's daughter in law called they have been able to get PT approved but all options have been exhausted with  and being told they make too much money for assistance they would like to proceed with palliative care  Claude Pham had told her also that annemarie has hemorrhoid that has been bleeding  He also has a cyst on his back that is red, infected and oozing  Claude Pham puts padding on it and is using ointment to treat it  Becky Palmer had a large blister on his foot that popped and is still very red  He's been checking his weight routinely and states he's gaining weight and is unsure if it is water weight  This all plays into more of why she feels they need palliative care

## 2022-08-24 DIAGNOSIS — G30.9 ALZHEIMER'S DEMENTIA WITH BEHAVIORAL DISTURBANCE, UNSPECIFIED TIMING OF DEMENTIA ONSET: Primary | ICD-10-CM

## 2022-08-24 DIAGNOSIS — F02.81 ALZHEIMER'S DEMENTIA WITH BEHAVIORAL DISTURBANCE, UNSPECIFIED TIMING OF DEMENTIA ONSET: Primary | ICD-10-CM

## 2022-08-24 NOTE — TELEPHONE ENCOUNTER
Spoke with DIL and she is aware that Dr Denis Quinn was going to place the order for Palliative Care

## 2022-08-30 ENCOUNTER — TELEPHONE (OUTPATIENT)
Dept: FAMILY MEDICINE CLINIC | Facility: CLINIC | Age: 87
End: 2022-08-30

## 2022-08-30 DIAGNOSIS — F02.81 ALZHEIMER'S DEMENTIA WITH BEHAVIORAL DISTURBANCE, UNSPECIFIED TIMING OF DEMENTIA ONSET: Primary | ICD-10-CM

## 2022-08-30 DIAGNOSIS — G30.9 ALZHEIMER'S DEMENTIA WITH BEHAVIORAL DISTURBANCE, UNSPECIFIED TIMING OF DEMENTIA ONSET: Primary | ICD-10-CM

## 2022-08-30 NOTE — TELEPHONE ENCOUNTER
Bossman Guerra called, she would like to know if you can put in an order for  Hospice Eval and treat for Dorian Edwards?

## 2022-08-31 ENCOUNTER — HOME CARE VISIT (OUTPATIENT)
Dept: HOME HEALTH SERVICES | Facility: HOME HEALTHCARE | Age: 87
End: 2022-08-31

## 2022-09-18 NOTE — ASSESSMENT & PLAN NOTE
Patient has severe dementia and is not able to care for self at all and needs heavy care  Pt needs a lot of help with their ADLs by family

## 2022-09-18 NOTE — PROGRESS NOTES
Name: Kyle Culp      : 1932      MRN: [id number]  Encounter Provider: Johnny Gillis MD  Encounter Date: 2022   Encounter department: [address] [place] [address] Place     1  Gastroesophageal reflux disease without esophagitis  Assessment & Plan:  Patient to continue with present therapy and decrease caffeine, avoid ETOH and smoking to decrease acid production  Pt should also cease eating prior to bedtime and avoid excessive fluid intake prior to sleep  May use antacids as needed for breakthrough GERD  All pateint questions answered today about this condition  2  CARROLL (obstructive sleep apnea)  Assessment & Plan:  Patient instructed to continue using CPAP as directed to decrease episodes of apnea to minimize risk of cardiac complications  Pt instructed to kep  machine in clean working order and to call if any replacement parts are needed  3  Atrial fibrillation, unspecified type [facility])  Assessment & Plan:  Continue with anticogulation and rate control of heart rate  Concerns about condition were addressed today  4  Essential hypertension  Assessment & Plan:  Patient is stable with current anti-hypertensive medicine and continue to follow a low sodium diet and take current medication  All questions about this condition were answered today  5  Alzheimer disease [facility])  Assessment & Plan:  Patient has severe dementia and is not able to care for self at all and needs heavy care  Pt needs a lot of help with their ADLs by family  Falls Plan of Care: balance, strength, and gait training instructions were provided  Home safety education provided  Subjective     This is a 24-year-old male seen examined for multiple medical problems and a house call  The patient has dementia lives with his wife and has a supportive his daughter next door he has history of atrial fibrillation knee me a hypertension and is doing well    Patient also has sleep apnea   Since his last visit the patient had a fall at home and it was decided that he was going to go on hospice care due to his declining status and his dementia  Subsequently the patient and his wife for good be going to an assisted living facility and the be making at transfer in the matter of weeks  Patient is alert and awake he is pleasantly confused  He is on hospice so lab work at this time necessary and appears to be very comfortable and not short of breath  Review of Systems   Unable to perform ROS: Dementia       Past Medical History:   Diagnosis Date    Anemia     Atrial fibrillation ([address] )     Hypertension      Past Surgical History:   Procedure Laterality Date    CARDIAC ELECTROPHYSIOLOGY STUDY AND ABLATION      CARDIAC PACEMAKER PLACEMENT  2019    OTHER SURGICAL HISTORY      Cancer Surgery and Brachytherapy radioelements per Ranji    TONSILLECTOMY       Family History   Family history unknown: Yes     Social History     Socioeconomic History    Marital status: /Civil Union     Spouse name: None    Number of children: None    Years of education: None    Highest education level: None   Occupational History    Occupation: Retired   Tobacco Use    Smoking status: Never Smoker    Smokeless tobacco: Never Used   Vaping Use    Vaping Use: Never used   Substance and Sexual Activity    Alcohol use: Yes     Comment: occasional    Drug use: None    Sexual activity: None   Other Topics Concern    None   Social History Narrative    · Most recent tobacco use screenin2020      · Do you currently or have you served in the [employer]:   No      · Were you activated, into active duty, as a member of the [employer] or as a Reservist:   No      · Exercise level:    Moderate      · Diet:   Regular      · General stress level:   Medium      · Alcohol intake:   Occasional      · Caffeine intake:   Heavy      · Seat belts used routinely:   Yes      · Smoke alarm in home: Yes      · Advance directive:   Yes      Social Determinants of Health     Financial Resource Strain: Not on file   Food Insecurity: Not on file   Transportation Needs: Not on file   Physical Activity: Not on file   Stress: Not on file   Social Connections: Not on file   Intimate Partner Violence: Not on file   Housing Stability: Not on file     Current Outpatient Medications on File Prior to Visit   Medication Sig    acetaminophen (TYLENOL) 500 mg tablet Take 500 mg by mouth 2 (two) times a day    ascorbic acid (VITAMIN C) 1000 MG tablet Take 1,000 mg by mouth daily    bimatoprost (LUMIGAN) 0 01 % ophthalmic drops 1 drop daily at bedtime    Eliquis 2 5 MG TAKE 1 TABLET BY MOUTH  TWICE DAILY    mupirocin (BACTROBAN) 2 % ointment     torsemide (DEMADEX) 20 mg tablet TAKE 1 TABLET BY MOUTH  DAILY    zinc oxide (Balmex Complete Protection) 11 3 % cream Apply topically 2 (two) times a day     No Known Allergies  Immunization History   Administered Date(s) Administered    COVID-19 PFIZER VACCINE 0 3 ML IM 05/12/2021, 06/02/2021, 12/20/2021    Tdap 08/04/2021       Objective     There were no vitals taken for this visit  Physical Exam  Vitals and nursing note reviewed  Constitutional:       Appearance: He is well-developed  HENT:      Head: Normocephalic and atraumatic  Nose: Nose normal       Mouth/Throat:      Mouth: Mucous membranes are moist    Eyes:      General: No scleral icterus  Conjunctiva/sclera: Conjunctivae normal       Pupils: Pupils are equal, round, and reactive to light  Neck:      Thyroid: No thyromegaly  Cardiovascular:      Rate and Rhythm: Normal rate and regular rhythm  Heart sounds: Normal heart sounds  Pulmonary:      Effort: Pulmonary effort is normal  No respiratory distress  Breath sounds: Normal breath sounds  No wheezing  Abdominal:      General: Bowel sounds are normal       Palpations: Abdomen is soft  Tenderness:  There is no abdominal tenderness  There is no guarding or rebound  Musculoskeletal:         General: Normal range of motion  Cervical back: Normal range of motion and neck supple  Skin:     General: Skin is warm and dry  Findings: No rash  Neurological:      Mental Status: He is alert  Mental status is at baseline  He is disoriented  Motor: Weakness present        Gait: Gait abnormal    Psychiatric:         Behavior: Behavior normal        Santo Beaver MD

## 2022-09-19 ENCOUNTER — OFFICE VISIT (OUTPATIENT)
Dept: FAMILY MEDICINE CLINIC | Facility: CLINIC | Age: 87
End: 2022-09-19
Payer: MEDICARE

## 2022-09-19 DIAGNOSIS — G47.33 OSA (OBSTRUCTIVE SLEEP APNEA): ICD-10-CM

## 2022-09-19 DIAGNOSIS — F02.80 ALZHEIMER DISEASE (HCC): ICD-10-CM

## 2022-09-19 DIAGNOSIS — I48.91 ATRIAL FIBRILLATION, UNSPECIFIED TYPE (HCC): ICD-10-CM

## 2022-09-19 DIAGNOSIS — I10 ESSENTIAL HYPERTENSION: ICD-10-CM

## 2022-09-19 DIAGNOSIS — K21.9 GASTROESOPHAGEAL REFLUX DISEASE WITHOUT ESOPHAGITIS: Primary | ICD-10-CM

## 2022-09-19 DIAGNOSIS — G30.9 ALZHEIMER DISEASE (HCC): ICD-10-CM

## 2022-09-19 PROCEDURE — 99349 HOME/RES VST EST MOD MDM 40: CPT | Performed by: FAMILY MEDICINE

## 2022-09-26 ENCOUNTER — PATIENT OUTREACH (OUTPATIENT)
Dept: FAMILY MEDICINE CLINIC | Facility: CLINIC | Age: 87
End: 2022-09-26

## 2022-09-26 NOTE — PROGRESS NOTES
OPCM SW placed follow up call to patient's wife  Patient has been admitted to Hospice with [facility]  Patient is receiving aide care 2x per week and nursing visits as well  Wife reports that she is waiting for a call from [employer] for the availability of a personal cottage today  Wife reports that she should hear from the facility either today or tomorrow if they have received the cottage  They were not expecting this to happen so fast as they were 8th on the list; however, they did move to the top of the list    The annuity will cover the first 100 days and then the long term care plan will pay the remainder  Son and DIL are working with [employer] to confirm [employer] is aware of situation  The plan is for their home to be rented so it is not sitting empty  Children are working on these arrangements now  [facility] will call back in the next few days for custodial room determination

## 2022-09-27 ENCOUNTER — TELEPHONE (OUTPATIENT)
Dept: FAMILY MEDICINE CLINIC | Facility: CLINIC | Age: 87
End: 2022-09-27

## 2022-09-27 NOTE — TELEPHONE ENCOUNTER
Bowen Koch the daughter in law called and stated that they just got approved for the assisted living and they need for you to go back to them to fill out the forms for the insurance company  Please call Bowen Koch at [phone] to schedule a time to go out there

## 2022-09-30 ENCOUNTER — PATIENT OUTREACH (OUTPATIENT)
Dept: FAMILY MEDICINE CLINIC | Facility: CLINIC | Age: 87
End: 2022-09-30

## 2022-09-30 NOTE — PROGRESS NOTES
OPCM SW placed follow up phone call  Wife reports that [facility] manager was at their home today to interview patient to make certain that the personal care cottage is appropriate for patient  Anticipate admission on November 1  DIL continues to assist patient and wife with managing the move to the [facility]  [facility] continues to provide hospice care in the home and will continue in the [facility]  SW is available though [facility] and has reached out to wife and patient  Referral closed as goals achieved

## 2022-10-01 NOTE — PROGRESS NOTES
Thanks  I had spoken with DIL and they will forward  and I will complete an MA 51? For admission to [facility] for him and his wife

## 2022-10-05 ENCOUNTER — TELEPHONE (OUTPATIENT)
Dept: FAMILY MEDICINE CLINIC | Facility: CLINIC | Age: 87
End: 2022-10-05

## 2022-10-05 NOTE — TELEPHONE ENCOUNTER
Pts DIL called regarding paperwork that you should have received for both [id number] [facility] in [facility]  She wanted to know if you have received it and if it has been filled out and faxed back yet? If not, she just wanted you to be aware that all of their meds including any supplements should be listed   Thanks!

## 2022-10-06 NOTE — TELEPHONE ENCOUNTER
Yes I completed it this AM but was not aware of any supplements they are taking  I gave both completed documents to Eric Guzman this morning

## 2022-10-28 ENCOUNTER — TELEPHONE (OUTPATIENT)
Dept: FAMILY MEDICINE CLINIC | Facility: CLINIC | Age: 87
End: 2022-10-28

## 2022-10-28 NOTE — TELEPHONE ENCOUNTER
Patient needs a letter to [facility] stating the following:    "Patient would benefit from the use of alternate sleeping arrangement due to medical condition "    This can be faxed to [phone] and needs to be completed as soon as possible as they are supposed to begin there on November 1st

## 2022-12-16 ENCOUNTER — TELEPHONE (OUTPATIENT)
Dept: DERMATOLOGY | Facility: CLINIC | Age: 87
End: 2022-12-16

## 2022-12-16 NOTE — TELEPHONE ENCOUNTER
Called patient to see if they could send photo, they are unable to  They are going to come in for a consult they needed a ride in so I arranged Lyft for them       Phone number for patient: [phone]  Sam Chavez address: [address] [place] [address], [address] [place] [address]

## 2023-01-26 ENCOUNTER — CONSULT (OUTPATIENT)
Dept: DERMATOLOGY | Facility: CLINIC | Age: 88
End: 2023-01-26

## 2023-01-26 VITALS — WEIGHT: 157 LBS | TEMPERATURE: 97 F | BODY MASS INDEX: 22.48 KG/M2 | HEIGHT: 70 IN

## 2023-01-26 DIAGNOSIS — C44.311 BASAL CELL CARCINOMA OF NOSE: Primary | ICD-10-CM

## 2023-01-26 RX ORDER — LEVOTHYROXINE SODIUM 0.03 MG/1
TABLET ORAL
COMMUNITY
Start: 2022-12-12

## 2023-01-26 NOTE — PATIENT INSTRUCTIONS
Assessment and Plan:  Treatment options discussed with patient and wife    Risks versus Benefits and current health were all weighed  Agreed to monitor area  Patient has his next skin exam scheduled with Dr Cookie Ryan in the spring

## 2023-01-26 NOTE — LETTER
January 26, 2023     Camille Siemens, [facility]  [address] [place] [id number]    Patient: Ambrosio Nugent   YOB: 1932   Date of Visit: 1/26/2023       Dear Dr Sara Cuevas: Thank you for referring Hannah Parisi to me for evaluation  Below are my notes for this consultation  If you have questions, please do not hesitate to call me  I look forward to following your patient along with you  Sincerely,        Emily Pearce MD        CC: No Recipients  Emily Pearce MD  1/26/2023  5:54 PM  Incomplete  MOHS CONSULTATION    Referring Physician:Dr Camille Siemens  Biopsy Location: right nose tip  Resulting Laboratory:  • Case number: [id number]  • Biopsy diagnosis: basal cell carcinoma, nodular  Patient on blood thinners: Yes              Reviewed directly with patient treatment options, specifically Mohs  Discussed possible surgical complications and alternatives  All questions were answered in full  Assessment and Plan:  • Repair will require coordination with Plastic Surgery or Oculofacial Plastic Surgery: No  • Treatment options discussed with patient and wife  • Risks versus Benefits and current health were all weighed  • Agreed to monitor area  • Patient has his next skin exam scheduled with Dr Sara Cuevas in the spring  • I discussed and reviewed options  It was noted that he is in hospice care for failing heart  and present lesion is not bothersome whatsoever to patient  Observation electedl  I reviewed how to contact us if clinical situation changes or spot becomes more rapidly problematic  Pre- operative Mohs Telephone Scheduling Note    Do you have a pacemaker or defibrillator? yes: pacemaker    Do you take any prescribed medications that thin your blood (Coumadin, Plavix, Xarelto, Eliquis or another prescribed blood thinner)?  yes: Eliquis    Do you use a cane, walker or wheelchair? yes: walker    Is the patient from a nursing home? yes: [facility]       Scribe Attestation    I,:  Dotty Conley am acting as a scribe while in the presence of the attending physician :       I,:  Terry Burns MD personally performed the services described in this documentation    as scribed in my presence :

## 2023-01-26 NOTE — PROGRESS NOTES
MOHS CONSULTATION    Referring Physician:Dr Ayush Ngo  Biopsy Location: right nose tip  Resulting Laboratory:  • Case number: [id number]  • Biopsy diagnosis: basal cell carcinoma, nodular  Patient on blood thinners: Yes              Reviewed directly with patient treatment options, specifically Mohs  Discussed possible surgical complications and alternatives  All questions were answered in full  Assessment and Plan:  • Repair will require coordination with Plastic Surgery or Oculofacial Plastic Surgery: No  • Treatment options discussed with patient and wife  • Risks versus Benefits and current health were all weighed  • Agreed to monitor area  • Patient has his next skin exam scheduled with Dr Winnie Majano in the spring  • I discussed and reviewed options  It was noted that he is in hospice care for failing heart  and present lesion is not bothersome whatsoever to patient  Observation electedl  I reviewed how to contact us if clinical situation changes or spot becomes more rapidly problematic  Pre- operative Mohs Telephone Scheduling Note    Do you have a pacemaker or defibrillator? yes: pacemaker    Do you take any prescribed medications that thin your blood (Coumadin, Plavix, Xarelto, Eliquis or another prescribed blood thinner)?  yes: Eliquis    Do you use a cane, walker or wheelchair? yes: walker    Is the patient from a nursing home? yes: [facility]       Scribe Attestation    I,:  Vale Kayser am acting as a scribe while in the presence of the attending physician :       I,:  Reema Cooney MD personally performed the services described in this documentation    as scribed in my presence :